# Patient Record
Sex: FEMALE | Race: ASIAN | NOT HISPANIC OR LATINO | URBAN - METROPOLITAN AREA
[De-identification: names, ages, dates, MRNs, and addresses within clinical notes are randomized per-mention and may not be internally consistent; named-entity substitution may affect disease eponyms.]

---

## 2017-02-20 ENCOUNTER — EMERGENCY (EMERGENCY)
Facility: HOSPITAL | Age: 57
LOS: 1 days | Discharge: HOME | End: 2017-02-20
Attending: EMERGENCY MEDICINE | Admitting: EMERGENCY MEDICINE
Payer: MEDICAID

## 2017-02-20 VITALS
DIASTOLIC BLOOD PRESSURE: 72 MMHG | HEART RATE: 70 BPM | SYSTOLIC BLOOD PRESSURE: 152 MMHG | RESPIRATION RATE: 16 BRPM | OXYGEN SATURATION: 100 %

## 2017-02-20 VITALS
DIASTOLIC BLOOD PRESSURE: 75 MMHG | HEART RATE: 71 BPM | SYSTOLIC BLOOD PRESSURE: 136 MMHG | RESPIRATION RATE: 18 BRPM | OXYGEN SATURATION: 100 % | TEMPERATURE: 98 F

## 2017-02-20 LAB
ALBUMIN SERPL ELPH-MCNC: 4 G/DL — SIGNIFICANT CHANGE UP (ref 3.3–5)
ALP SERPL-CCNC: 65 U/L — SIGNIFICANT CHANGE UP (ref 40–120)
ALT FLD-CCNC: 39 U/L — HIGH (ref 4–33)
APPEARANCE UR: CLEAR — SIGNIFICANT CHANGE UP
AST SERPL-CCNC: 39 U/L — HIGH (ref 4–32)
BACTERIA # UR AUTO: SIGNIFICANT CHANGE UP
BASOPHILS # BLD AUTO: 0.04 K/UL — SIGNIFICANT CHANGE UP (ref 0–0.2)
BASOPHILS NFR BLD AUTO: 0.5 % — SIGNIFICANT CHANGE UP (ref 0–2)
BILIRUB SERPL-MCNC: 0.6 MG/DL — SIGNIFICANT CHANGE UP (ref 0.2–1.2)
BILIRUB UR-MCNC: NEGATIVE — SIGNIFICANT CHANGE UP
BLOOD UR QL VISUAL: NEGATIVE — SIGNIFICANT CHANGE UP
BUN SERPL-MCNC: 22 MG/DL — SIGNIFICANT CHANGE UP (ref 7–23)
CALCIUM SERPL-MCNC: 9.6 MG/DL — SIGNIFICANT CHANGE UP (ref 8.4–10.5)
CHLORIDE SERPL-SCNC: 101 MMOL/L — SIGNIFICANT CHANGE UP (ref 98–107)
CO2 SERPL-SCNC: 25 MMOL/L — SIGNIFICANT CHANGE UP (ref 22–31)
COLOR SPEC: SIGNIFICANT CHANGE UP
CREAT SERPL-MCNC: 2.03 MG/DL — HIGH (ref 0.5–1.3)
EOSINOPHIL # BLD AUTO: 0.19 K/UL — SIGNIFICANT CHANGE UP (ref 0–0.5)
EOSINOPHIL NFR BLD AUTO: 2.3 % — SIGNIFICANT CHANGE UP (ref 0–6)
GLUCOSE SERPL-MCNC: 198 MG/DL — HIGH (ref 70–99)
GLUCOSE UR-MCNC: NEGATIVE — SIGNIFICANT CHANGE UP
HCT VFR BLD CALC: 41.8 % — SIGNIFICANT CHANGE UP (ref 34.5–45)
HGB BLD-MCNC: 14.2 G/DL — SIGNIFICANT CHANGE UP (ref 11.5–15.5)
HYALINE CASTS # UR AUTO: SIGNIFICANT CHANGE UP (ref 0–?)
IMM GRANULOCYTES NFR BLD AUTO: 0.1 % — SIGNIFICANT CHANGE UP (ref 0–1.5)
KETONES UR-MCNC: NEGATIVE — SIGNIFICANT CHANGE UP
LEUKOCYTE ESTERASE UR-ACNC: NEGATIVE — SIGNIFICANT CHANGE UP
LIDOCAIN IGE QN: 26.3 U/L — SIGNIFICANT CHANGE UP (ref 7–60)
LYMPHOCYTES # BLD AUTO: 2.6 K/UL — SIGNIFICANT CHANGE UP (ref 1–3.3)
LYMPHOCYTES # BLD AUTO: 31.6 % — SIGNIFICANT CHANGE UP (ref 13–44)
MCHC RBC-ENTMCNC: 30.2 PG — SIGNIFICANT CHANGE UP (ref 27–34)
MCHC RBC-ENTMCNC: 34 % — SIGNIFICANT CHANGE UP (ref 32–36)
MCV RBC AUTO: 88.9 FL — SIGNIFICANT CHANGE UP (ref 80–100)
MONOCYTES # BLD AUTO: 0.74 K/UL — SIGNIFICANT CHANGE UP (ref 0–0.9)
MONOCYTES NFR BLD AUTO: 9 % — SIGNIFICANT CHANGE UP (ref 2–14)
MUCOUS THREADS # UR AUTO: SIGNIFICANT CHANGE UP
NEUTROPHILS # BLD AUTO: 4.66 K/UL — SIGNIFICANT CHANGE UP (ref 1.8–7.4)
NEUTROPHILS NFR BLD AUTO: 56.5 % — SIGNIFICANT CHANGE UP (ref 43–77)
NITRITE UR-MCNC: NEGATIVE — SIGNIFICANT CHANGE UP
NON-SQ EPI CELLS # UR AUTO: <1 — SIGNIFICANT CHANGE UP
PH UR: 5.5 — SIGNIFICANT CHANGE UP (ref 4.6–8)
PLATELET # BLD AUTO: 137 K/UL — LOW (ref 150–400)
PMV BLD: 11.4 FL — SIGNIFICANT CHANGE UP (ref 7–13)
POTASSIUM SERPL-MCNC: 4.8 MMOL/L — SIGNIFICANT CHANGE UP (ref 3.5–5.3)
POTASSIUM SERPL-SCNC: 4.8 MMOL/L — SIGNIFICANT CHANGE UP (ref 3.5–5.3)
PROT SERPL-MCNC: 7.5 G/DL — SIGNIFICANT CHANGE UP (ref 6–8.3)
PROT UR-MCNC: NEGATIVE — SIGNIFICANT CHANGE UP
RBC # BLD: 4.7 M/UL — SIGNIFICANT CHANGE UP (ref 3.8–5.2)
RBC # FLD: 13.3 % — SIGNIFICANT CHANGE UP (ref 10.3–14.5)
RBC CASTS # UR COMP ASSIST: SIGNIFICANT CHANGE UP (ref 0–?)
SODIUM SERPL-SCNC: 141 MMOL/L — SIGNIFICANT CHANGE UP (ref 135–145)
SP GR SPEC: 1.01 — SIGNIFICANT CHANGE UP (ref 1–1.03)
SQUAMOUS # UR AUTO: SIGNIFICANT CHANGE UP
UROBILINOGEN FLD QL: NORMAL E.U. — SIGNIFICANT CHANGE UP (ref 0.1–0.2)
WBC # BLD: 8.24 K/UL — SIGNIFICANT CHANGE UP (ref 3.8–10.5)
WBC # FLD AUTO: 8.24 K/UL — SIGNIFICANT CHANGE UP (ref 3.8–10.5)
WBC UR QL: SIGNIFICANT CHANGE UP (ref 0–?)

## 2017-02-20 PROCEDURE — 99284 EMERGENCY DEPT VISIT MOD MDM: CPT

## 2017-02-20 PROCEDURE — 76830 TRANSVAGINAL US NON-OB: CPT | Mod: 26

## 2017-02-20 RX ORDER — SODIUM CHLORIDE 9 MG/ML
1000 INJECTION INTRAMUSCULAR; INTRAVENOUS; SUBCUTANEOUS ONCE
Qty: 0 | Refills: 0 | Status: COMPLETED | OUTPATIENT
Start: 2017-02-20 | End: 2017-02-20

## 2017-02-20 RX ORDER — METOCLOPRAMIDE HCL 10 MG
10 TABLET ORAL ONCE
Qty: 0 | Refills: 0 | Status: COMPLETED | OUTPATIENT
Start: 2017-02-20 | End: 2017-02-20

## 2017-02-20 RX ORDER — MECLIZINE HCL 12.5 MG
25 TABLET ORAL ONCE
Qty: 0 | Refills: 0 | Status: COMPLETED | OUTPATIENT
Start: 2017-02-20 | End: 2017-02-20

## 2017-02-20 RX ORDER — SODIUM CHLORIDE 9 MG/ML
3 INJECTION INTRAMUSCULAR; INTRAVENOUS; SUBCUTANEOUS ONCE
Qty: 0 | Refills: 0 | Status: COMPLETED | OUTPATIENT
Start: 2017-02-20 | End: 2017-02-20

## 2017-02-20 RX ADMIN — SODIUM CHLORIDE 1000 MILLILITER(S): 9 INJECTION INTRAMUSCULAR; INTRAVENOUS; SUBCUTANEOUS at 13:05

## 2017-02-20 RX ADMIN — Medication 10 MILLIGRAM(S): at 13:05

## 2017-02-20 RX ADMIN — SODIUM CHLORIDE 3 MILLILITER(S): 9 INJECTION INTRAMUSCULAR; INTRAVENOUS; SUBCUTANEOUS at 13:05

## 2017-02-20 NOTE — ED ADULT NURSE NOTE - OBJECTIVE STATEMENT
received pt to intake room 10b for evaluation of generalized abdominal pain, nausea, dizziness, fever and chills x 1 week, progressively worsening. pt presents awake a&ox4, denies ha or visual disturbances. skin pink, warm, dry, appropriate for race. respirations even unlabored. lungs cta. denies cp or sob. abdomen soft, tender to palpation, nondistended. bs+x4, denies active v/d. + nausea. ivl placed, bloods drawn and sent. medicated as ordered. ns 0.9% bolus infusing. awaiting ultrasound.

## 2017-02-20 NOTE — ED PROVIDER NOTE - NS ED MD SCRIBE ATTENDING SCRIBE SECTIONS
DISPOSITION/PHYSICAL EXAM/PAST MEDICAL/SURGICAL/SOCIAL HISTORY/HISTORY OF PRESENT ILLNESS/HIV/REVIEW OF SYSTEMS/VITAL SIGNS( Pullset)

## 2017-02-20 NOTE — ED PROVIDER NOTE - MEDICAL DECISION MAKING DETAILS
56 y/o F c/o lower abd pain and HA w/ dizziness. Plan: adnexal torsion vs cyst rupture, UA r/o DKA. 56 y/o F c/o lower abd pain and HA w/ dizziness. Plan: adnexal torsion vs cyst rupture, UA r/o DKA. labs all wnl except mild kidney disease. pt admits to hx of kidney disease secondary to poor dm management. labs otherwise with no dka. pt symptomatically improved. no headache at this point. instructed to f/u with pmd asap/ 58 y/o F c/o lower abd pain and HA w/ dizziness. Plan: adnexal torsion vs cyst rupture, UA r/o DKA. labs all wnl except mild kidney disease. pt admits to hx of kidney disease secondary to poor dm management. labs otherwise with no dka. pt symptomatically improved. no headache at this point. instructed to f/u with pmd asap. dr vanegas contacted and will f/u with results.

## 2017-02-20 NOTE — ED ADULT TRIAGE NOTE - CHIEF COMPLAINT QUOTE
Co left lower back pain radiating to abdomen with n/v x 1 week. Hx of diabetes, high cholesterol, anxiety.

## 2017-02-20 NOTE — ED PROVIDER NOTE - PROGRESS NOTE DETAILS
pt asymptomatic with no complications. dr. Krishnamurthy, nusrat contacted and informed of elevated creatine. will f/u outpt. pt stable. vss pt denying any abd pain, waiting for ambulette in NAD. vss.

## 2017-02-20 NOTE — ED PROVIDER NOTE - OBJECTIVE STATEMENT
58 y/o F w/ PMHx of DM poorly controlled, presents to the ED c/o lower abd pain and HA w/ dizziness. Pt notes dizziness is worse w/ movement of head, no localization of HA. Pt admits to one episode of hematuria 1 week ago, w/ no recurrent episodes. Pt endorses polyuria, adnexal pain w/ no discharge or bleeding and lower back pain that is similar in level as abd pain. Denies fever, dysuria, CP or any other complaints. 58 y/o F w/ PMHx of DM poorly controlled, presents to the ED c/o lower abd pain and HA w/ dizziness. Pt notes dizziness is worse w/ movement of head, no localization of HA.  similar dizziness to prior episodes. Pt admits to one episode of hematuria 1 week ago, w/ no recurrent episodes. Pt endorses polyuria, adnexal pain w/ no discharge or bleeding and lower back pain that is similar in level as abd pain. states pain is 4/10 lower abdomen but currently improved without analgesia.  Denies fever, dysuria, CP or any other complaints.

## 2017-02-21 NOTE — ED POST DISCHARGE NOTE - ADDITIONAL DOCUMENTATION
Ms. Lockett states still feels weak, with mild abd pain but states minimal improvement. at time of discharge pt's pain was resolved. I (gera) instructed pt to return to ed if pain worsens, starts having changes in amount of urination pt states had hx of elevated creatine but unsure of number. pt states will be going to dr. Krishnamurthy's office today. if not able to present today, will go on friday. pt again reminded to return to ed if symptoms worsen. pt grateful and understands instructions.

## 2017-05-10 ENCOUNTER — APPOINTMENT (OUTPATIENT)
Dept: GASTROENTEROLOGY | Facility: CLINIC | Age: 57
End: 2017-05-10

## 2017-05-10 VITALS
BODY MASS INDEX: 25.34 KG/M2 | TEMPERATURE: 98.5 F | SYSTOLIC BLOOD PRESSURE: 100 MMHG | HEIGHT: 63 IN | WEIGHT: 143 LBS | DIASTOLIC BLOOD PRESSURE: 70 MMHG

## 2017-05-10 DIAGNOSIS — Z12.11 ENCOUNTER FOR SCREENING FOR MALIGNANT NEOPLASM OF COLON: ICD-10-CM

## 2017-05-10 DIAGNOSIS — Z86.39 PERSONAL HISTORY OF OTHER ENDOCRINE, NUTRITIONAL AND METABOLIC DISEASE: ICD-10-CM

## 2017-05-10 DIAGNOSIS — Z82.49 FAMILY HISTORY OF ISCHEMIC HEART DISEASE AND OTHER DISEASES OF THE CIRCULATORY SYSTEM: ICD-10-CM

## 2017-05-10 DIAGNOSIS — R14.0 ABDOMINAL DISTENSION (GASEOUS): ICD-10-CM

## 2017-05-10 DIAGNOSIS — Z78.9 OTHER SPECIFIED HEALTH STATUS: ICD-10-CM

## 2017-05-10 DIAGNOSIS — K21.9 GASTRO-ESOPHAGEAL REFLUX DISEASE W/OUT ESOPHAGITIS: ICD-10-CM

## 2017-05-10 PROBLEM — Z00.00 ENCOUNTER FOR PREVENTIVE HEALTH EXAMINATION: Status: ACTIVE | Noted: 2017-05-10

## 2017-05-10 RX ORDER — SITAGLIPTIN 100 MG/1
100 TABLET, FILM COATED ORAL
Refills: 0 | Status: ACTIVE | COMMUNITY

## 2017-05-10 RX ORDER — METFORMIN HYDROCHLORIDE 1000 MG/1
1000 TABLET, COATED ORAL
Refills: 0 | Status: ACTIVE | COMMUNITY

## 2017-05-10 RX ORDER — POLYETHYLENE GLYCOL 3350, SODIUM SULFATE, SODIUM CHLORIDE, POTASSIUM CHLORIDE, ASCORBIC ACID, SODIUM ASCORBATE 7.5-2.691G
100 KIT ORAL
Qty: 1 | Refills: 0 | Status: ACTIVE | COMMUNITY
Start: 2017-05-10 | End: 1900-01-01

## 2017-05-10 RX ORDER — PANTOPRAZOLE 40 MG/1
40 TABLET, DELAYED RELEASE ORAL TWICE DAILY
Qty: 60 | Refills: 10 | Status: ACTIVE | COMMUNITY
Start: 2017-05-10 | End: 1900-01-01

## 2017-05-10 RX ORDER — PANTOPRAZOLE 40 MG/1
40 TABLET, DELAYED RELEASE ORAL
Refills: 0 | Status: ACTIVE | COMMUNITY

## 2017-05-10 RX ORDER — GLIMEPIRIDE 4 MG/1
4 TABLET ORAL
Refills: 0 | Status: ACTIVE | COMMUNITY

## 2017-05-10 RX ORDER — POLYETHYLENE GLYCOL 3350, SODIUM CHLORIDE, SODIUM BICARBONATE AND POTASSIUM CHLORIDE WITH LEMON FLAVOR 420; 11.2; 5.72; 1.48 G/4L; G/4L; G/4L; G/4L
420 POWDER, FOR SOLUTION ORAL
Qty: 1 | Refills: 0 | Status: ACTIVE | COMMUNITY
Start: 2017-05-10 | End: 1900-01-01

## 2017-05-10 RX ORDER — ASPIRIN 81 MG
81 TABLET, DELAYED RELEASE (ENTERIC COATED) ORAL
Refills: 0 | Status: ACTIVE | COMMUNITY

## 2017-07-21 ENCOUNTER — APPOINTMENT (OUTPATIENT)
Dept: GASTROENTEROLOGY | Facility: HOSPITAL | Age: 57
End: 2017-07-21

## 2018-07-28 PROBLEM — Z78.9 ALCOHOL USE: Status: INACTIVE | Noted: 2017-05-10

## 2021-07-05 NOTE — ED ADULT NURSE NOTE - THOUGHTS OF HOMICIDE/VIOLENCE TOWARDS OTHERS YN, MLM
F: tolerating PO, no IVF  E: replete K<4, Mg<2  N: Dash/TLC    VTE Prophylaxis: SCD in the setting of sever thrombocytopenia   C: Full Code  D: RMF
No

## 2022-05-24 ENCOUNTER — INPATIENT (INPATIENT)
Facility: HOSPITAL | Age: 62
LOS: 6 days | Discharge: HOME | End: 2022-05-31
Attending: INTERNAL MEDICINE | Admitting: INTERNAL MEDICINE
Payer: MEDICAID

## 2022-05-24 VITALS
SYSTOLIC BLOOD PRESSURE: 134 MMHG | RESPIRATION RATE: 16 BRPM | DIASTOLIC BLOOD PRESSURE: 59 MMHG | OXYGEN SATURATION: 98 % | TEMPERATURE: 99 F | HEART RATE: 92 BPM

## 2022-05-24 DIAGNOSIS — N12 TUBULO-INTERSTITIAL NEPHRITIS, NOT SPECIFIED AS ACUTE OR CHRONIC: ICD-10-CM

## 2022-05-24 PROBLEM — E11.9 TYPE 2 DIABETES MELLITUS WITHOUT COMPLICATIONS: Chronic | Status: ACTIVE | Noted: 2017-02-20

## 2022-05-24 LAB
ALBUMIN SERPL ELPH-MCNC: 3.9 G/DL — SIGNIFICANT CHANGE UP (ref 3.3–5)
ALP SERPL-CCNC: 80 U/L — SIGNIFICANT CHANGE UP (ref 40–120)
ALT FLD-CCNC: 17 U/L — SIGNIFICANT CHANGE UP (ref 4–33)
ANION GAP SERPL CALC-SCNC: 12 MMOL/L — SIGNIFICANT CHANGE UP (ref 7–14)
APPEARANCE UR: ABNORMAL
AST SERPL-CCNC: 20 U/L — SIGNIFICANT CHANGE UP (ref 4–32)
B PERT DNA SPEC QL NAA+PROBE: SIGNIFICANT CHANGE UP
B PERT+PARAPERT DNA PNL SPEC NAA+PROBE: SIGNIFICANT CHANGE UP
BACTERIA # UR AUTO: ABNORMAL
BASOPHILS # BLD AUTO: 0.03 K/UL — SIGNIFICANT CHANGE UP (ref 0–0.2)
BASOPHILS NFR BLD AUTO: 0.2 % — SIGNIFICANT CHANGE UP (ref 0–2)
BILIRUB SERPL-MCNC: 0.7 MG/DL — SIGNIFICANT CHANGE UP (ref 0.2–1.2)
BILIRUB UR-MCNC: NEGATIVE — SIGNIFICANT CHANGE UP
BLOOD GAS VENOUS COMPREHENSIVE RESULT: SIGNIFICANT CHANGE UP
BLOOD GAS VENOUS COMPREHENSIVE RESULT: SIGNIFICANT CHANGE UP
BORDETELLA PARAPERTUSSIS (RAPRVP): SIGNIFICANT CHANGE UP
BUN SERPL-MCNC: 14 MG/DL — SIGNIFICANT CHANGE UP (ref 7–23)
C PNEUM DNA SPEC QL NAA+PROBE: SIGNIFICANT CHANGE UP
CALCIUM SERPL-MCNC: 9.9 MG/DL — SIGNIFICANT CHANGE UP (ref 8.4–10.5)
CHLORIDE SERPL-SCNC: 93 MMOL/L — LOW (ref 98–107)
CO2 SERPL-SCNC: 27 MMOL/L — SIGNIFICANT CHANGE UP (ref 22–31)
COLOR SPEC: ABNORMAL
CREAT SERPL-MCNC: 0.78 MG/DL — SIGNIFICANT CHANGE UP (ref 0.5–1.3)
DIFF PNL FLD: ABNORMAL
EGFR: 86 ML/MIN/1.73M2 — SIGNIFICANT CHANGE UP
EOSINOPHIL # BLD AUTO: 0.04 K/UL — SIGNIFICANT CHANGE UP (ref 0–0.5)
EOSINOPHIL NFR BLD AUTO: 0.3 % — SIGNIFICANT CHANGE UP (ref 0–6)
EPI CELLS # UR: SIGNIFICANT CHANGE UP /HPF (ref 0–5)
FLUAV SUBTYP SPEC NAA+PROBE: SIGNIFICANT CHANGE UP
FLUBV RNA SPEC QL NAA+PROBE: SIGNIFICANT CHANGE UP
GLUCOSE SERPL-MCNC: 180 MG/DL — HIGH (ref 70–99)
GLUCOSE UR QL: NEGATIVE — SIGNIFICANT CHANGE UP
HADV DNA SPEC QL NAA+PROBE: SIGNIFICANT CHANGE UP
HCOV 229E RNA SPEC QL NAA+PROBE: SIGNIFICANT CHANGE UP
HCOV HKU1 RNA SPEC QL NAA+PROBE: SIGNIFICANT CHANGE UP
HCOV NL63 RNA SPEC QL NAA+PROBE: SIGNIFICANT CHANGE UP
HCOV OC43 RNA SPEC QL NAA+PROBE: SIGNIFICANT CHANGE UP
HCT VFR BLD CALC: 38.4 % — SIGNIFICANT CHANGE UP (ref 34.5–45)
HGB BLD-MCNC: 12.1 G/DL — SIGNIFICANT CHANGE UP (ref 11.5–15.5)
HMPV RNA SPEC QL NAA+PROBE: SIGNIFICANT CHANGE UP
HPIV1 RNA SPEC QL NAA+PROBE: SIGNIFICANT CHANGE UP
HPIV2 RNA SPEC QL NAA+PROBE: SIGNIFICANT CHANGE UP
HPIV3 RNA SPEC QL NAA+PROBE: SIGNIFICANT CHANGE UP
HPIV4 RNA SPEC QL NAA+PROBE: SIGNIFICANT CHANGE UP
IANC: 9.66 K/UL — HIGH (ref 1.8–7.4)
IMM GRANULOCYTES NFR BLD AUTO: 0.4 % — SIGNIFICANT CHANGE UP (ref 0–1.5)
KETONES UR-MCNC: NEGATIVE — SIGNIFICANT CHANGE UP
LEUKOCYTE ESTERASE UR-ACNC: ABNORMAL
LIDOCAIN IGE QN: 28 U/L — SIGNIFICANT CHANGE UP (ref 7–60)
LYMPHOCYTES # BLD AUTO: 17.4 % — SIGNIFICANT CHANGE UP (ref 13–44)
LYMPHOCYTES # BLD AUTO: 2.24 K/UL — SIGNIFICANT CHANGE UP (ref 1–3.3)
M PNEUMO DNA SPEC QL NAA+PROBE: SIGNIFICANT CHANGE UP
MCHC RBC-ENTMCNC: 27.3 PG — SIGNIFICANT CHANGE UP (ref 27–34)
MCHC RBC-ENTMCNC: 31.5 GM/DL — LOW (ref 32–36)
MCV RBC AUTO: 86.5 FL — SIGNIFICANT CHANGE UP (ref 80–100)
MONOCYTES # BLD AUTO: 0.85 K/UL — SIGNIFICANT CHANGE UP (ref 0–0.9)
MONOCYTES NFR BLD AUTO: 6.6 % — SIGNIFICANT CHANGE UP (ref 2–14)
NEUTROPHILS # BLD AUTO: 9.66 K/UL — HIGH (ref 1.8–7.4)
NEUTROPHILS NFR BLD AUTO: 75.1 % — SIGNIFICANT CHANGE UP (ref 43–77)
NITRITE UR-MCNC: NEGATIVE — SIGNIFICANT CHANGE UP
NRBC # BLD: 0 /100 WBCS — SIGNIFICANT CHANGE UP
NRBC # FLD: 0 K/UL — SIGNIFICANT CHANGE UP
PH UR: 6.5 — SIGNIFICANT CHANGE UP (ref 5–8)
PLATELET # BLD AUTO: 120 K/UL — LOW (ref 150–400)
POTASSIUM SERPL-MCNC: 4.2 MMOL/L — SIGNIFICANT CHANGE UP (ref 3.5–5.3)
POTASSIUM SERPL-SCNC: 4.2 MMOL/L — SIGNIFICANT CHANGE UP (ref 3.5–5.3)
PROT SERPL-MCNC: 9.9 G/DL — HIGH (ref 6–8.3)
PROT UR-MCNC: ABNORMAL
RAPID RVP RESULT: SIGNIFICANT CHANGE UP
RBC # BLD: 4.44 M/UL — SIGNIFICANT CHANGE UP (ref 3.8–5.2)
RBC # FLD: 15.1 % — HIGH (ref 10.3–14.5)
RBC CASTS # UR COMP ASSIST: 2 /HPF — SIGNIFICANT CHANGE UP (ref 0–4)
RSV RNA SPEC QL NAA+PROBE: SIGNIFICANT CHANGE UP
RV+EV RNA SPEC QL NAA+PROBE: SIGNIFICANT CHANGE UP
SARS-COV-2 RNA SPEC QL NAA+PROBE: SIGNIFICANT CHANGE UP
SODIUM SERPL-SCNC: 132 MMOL/L — LOW (ref 135–145)
SP GR SPEC: 1.03 — SIGNIFICANT CHANGE UP (ref 1–1.05)
TROPONIN T, HIGH SENSITIVITY RESULT: 12 NG/L — SIGNIFICANT CHANGE UP
UROBILINOGEN FLD QL: SIGNIFICANT CHANGE UP
WBC # BLD: 12.87 K/UL — HIGH (ref 3.8–10.5)
WBC # FLD AUTO: 12.87 K/UL — HIGH (ref 3.8–10.5)
WBC UR QL: SIGNIFICANT CHANGE UP /HPF (ref 0–5)

## 2022-05-24 PROCEDURE — 74177 CT ABD & PELVIS W/CONTRAST: CPT | Mod: 26,MA

## 2022-05-24 PROCEDURE — 99285 EMERGENCY DEPT VISIT HI MDM: CPT | Mod: 25

## 2022-05-24 PROCEDURE — 71046 X-RAY EXAM CHEST 2 VIEWS: CPT | Mod: 26

## 2022-05-24 PROCEDURE — 93010 ELECTROCARDIOGRAM REPORT: CPT

## 2022-05-24 RX ORDER — ONDANSETRON 8 MG/1
4 TABLET, FILM COATED ORAL ONCE
Refills: 0 | Status: COMPLETED | OUTPATIENT
Start: 2022-05-24 | End: 2022-05-24

## 2022-05-24 RX ORDER — SODIUM CHLORIDE 9 MG/ML
1000 INJECTION, SOLUTION INTRAVENOUS ONCE
Refills: 0 | Status: COMPLETED | OUTPATIENT
Start: 2022-05-24 | End: 2022-05-24

## 2022-05-24 RX ORDER — SODIUM CHLORIDE 9 MG/ML
3 INJECTION INTRAMUSCULAR; INTRAVENOUS; SUBCUTANEOUS EVERY 8 HOURS
Refills: 0 | Status: DISCONTINUED | OUTPATIENT
Start: 2022-05-24 | End: 2022-05-30

## 2022-05-24 RX ORDER — VANCOMYCIN HCL 1 G
1000 VIAL (EA) INTRAVENOUS ONCE
Refills: 0 | Status: COMPLETED | OUTPATIENT
Start: 2022-05-24 | End: 2022-05-24

## 2022-05-24 RX ORDER — AZTREONAM 2 G
1000 VIAL (EA) INJECTION ONCE
Refills: 0 | Status: COMPLETED | OUTPATIENT
Start: 2022-05-24 | End: 2022-05-24

## 2022-05-24 RX ORDER — SODIUM CHLORIDE 9 MG/ML
1000 INJECTION, SOLUTION INTRAVENOUS
Refills: 0 | Status: DISCONTINUED | OUTPATIENT
Start: 2022-05-24 | End: 2022-05-27

## 2022-05-24 RX ORDER — KETOROLAC TROMETHAMINE 30 MG/ML
15 SYRINGE (ML) INJECTION ONCE
Refills: 0 | Status: DISCONTINUED | OUTPATIENT
Start: 2022-05-24 | End: 2022-05-24

## 2022-05-24 RX ORDER — ACETAMINOPHEN 500 MG
1000 TABLET ORAL ONCE
Refills: 0 | Status: COMPLETED | OUTPATIENT
Start: 2022-05-24 | End: 2022-05-24

## 2022-05-24 RX ORDER — CEFTRIAXONE 500 MG/1
1000 INJECTION, POWDER, FOR SOLUTION INTRAMUSCULAR; INTRAVENOUS ONCE
Refills: 0 | Status: DISCONTINUED | OUTPATIENT
Start: 2022-05-24 | End: 2022-05-24

## 2022-05-24 RX ADMIN — ONDANSETRON 4 MILLIGRAM(S): 8 TABLET, FILM COATED ORAL at 16:07

## 2022-05-24 RX ADMIN — SODIUM CHLORIDE 1000 MILLILITER(S): 9 INJECTION, SOLUTION INTRAVENOUS at 16:06

## 2022-05-24 RX ADMIN — ONDANSETRON 4 MILLIGRAM(S): 8 TABLET, FILM COATED ORAL at 23:12

## 2022-05-24 RX ADMIN — Medication 50 MILLIGRAM(S): at 23:47

## 2022-05-24 RX ADMIN — Medication 15 MILLIGRAM(S): at 23:12

## 2022-05-24 RX ADMIN — SODIUM CHLORIDE 1000 MILLILITER(S): 9 INJECTION, SOLUTION INTRAVENOUS at 22:04

## 2022-05-24 RX ADMIN — Medication 400 MILLIGRAM(S): at 16:24

## 2022-05-24 RX ADMIN — Medication 250 MILLIGRAM(S): at 22:45

## 2022-05-24 NOTE — ED PROVIDER NOTE - PHYSICAL EXAMINATION
VS:  unremarkable    GEN - Malaise and fatigue  HEAD - NC/AT     ENT - PEERL, EOMI, Dry mucous membranes      NECK: Neck supple, non-tender without lymphadenopathy, no masses, no JVD  PULM - CTA b/l,  symmetric breath sounds  COR -  normal heart sounds    ABD - , ND, NT, soft, No abdominal distention   BACK - no CVA tenderness, nontender spine     EXTREMS - no edema, no deformity, warm and well perfused. Left lower quad tenderness   SKIN - no rash    or bruising      NEUROLOGIC - alert, face symmetric, speech fluent, sensation nl, motor no focal deficit.

## 2022-05-24 NOTE — ED ADULT NURSE NOTE - NSIMPLEMENTINTERV_GEN_ALL_ED
Implemented All Fall Risk Interventions:  [place] to call system. Call bell, personal items and telephone within reach. Instruct patient to call for assistance. Room bathroom lighting operational. Non-slip footwear when patient is off stretcher. Physically safe environment: no spills, clutter or unnecessary equipment. Stretcher in lowest position, wheels locked, appropriate side rails in place. Provide visual cue, wrist band, yellow gown, etc. Monitor gait and stability. Monitor for mental status changes and reorient to person, place, and time. Review medications for side effects contributing to fall risk. Reinforce activity limits and safety measures with patient and family.

## 2022-05-24 NOTE — ED PROVIDER NOTE - OBJECTIVE STATEMENT
63 y/o F w/ PMHx of diabetes (takes Metformin and other medications, she is unaware of the names) and PSHx of appendectomy presents to the ED c/o right flank pain radiating to the abdomen for four days PTA. The pain is associated w/ vomiting and she is unable to tolerate oral intake. Pt states the pain continues to worsen each day. She reports lightheadedness, dizziness, and vomiting for months. Denies diarrhea. Pt reports associated symptoms of fever. She has seen her MD who gave her more than 1 course of antibiotics. Pt states they have dysuria and that the recent course of antibiotic gave her an allergic reaction, which was vomiting. Her MD did not obtain imaging. Pt denies HTN, CAD and thyroid issues. She denies smoking. Allergic to codeine and penicillin which makes her vomit. Pt is unsure of the allergy or what it is called. Her PMD is JUAN C Wilburn and their number is [phone].

## 2022-05-24 NOTE — ED ADULT NURSE NOTE - OBJECTIVE STATEMENT
62 year old female received to intake rm 3 AAOx4 ambulatory with cane. Pt c/o recurrent UTI x4 months. Pt c/o lightheadedness, abd pain, n/v, fever, unsteady gait d/t "shakiness", burning with urination. Pt given abx from [facility] in March but stopped taking them d/t nausea/vomiting. Respirations even and unlabored. PIV #20 left wrist, labs drawn and sent. VS as noted. Medicated per MD orders. Bed in lowest position, call bell within reach

## 2022-05-24 NOTE — ED PROVIDER NOTE - CLINICAL SUMMARY MEDICAL DECISION MAKING FREE TEXT BOX
63 y/o F presents to the ED c/o nausea, vomiting, and abdominal pain. In a setting of treatment for UTI. Possibilites are broad but include UTI, pyelonephritis, cholecystis, bowel obstruction and less likely but can include DKA or ACS. Will obtain labs, CT scans, check urine and provide symptomatic treatment.

## 2022-05-24 NOTE — ED ADULT TRIAGE NOTE - CHIEF COMPLAINT QUOTE
Pt c/o chronic UTI's x4 months.  Since yesterday c/o abd pain, lightheadedness, dizziness, and vomiting.  Given antibiotics but they made her nauseous so she stopped them.

## 2022-05-24 NOTE — ED PROVIDER NOTE - PROGRESS NOTE DETAILS
DD ED ATTG:  worsening.  Pt having chills, nausea.  CT and UA c/f pyelo.  D/w PMD at above number - he tx her with macrobid x 2.  Per PMD pt lives alone and he was concerned due to her being disheveled and isolated.  Per PMD pt did not eat for two weeks.  PMD initiated home care/home attendant, but pt did not pursue this.  Pt does not have relatives nearby.  Given chills, pyelo, poor PO intake, poor follow up potential will admit for IV abx and SW/CM eval.  Per PMD - meds metformin ramipril glimepiride famotidine.  last hgb a1c 9.1.    last urine culture ecoli - Resistant to levaquin, bactrim cipro. DD ED ATTG:  Pt noted to have chills and high fever to nearly 104.   d/w admitting hospitalist Dr Meyer - Given possible abx resistance and chills he recc azactam and vanco given pcn all.  Cultures and lactate sent.

## 2022-05-25 DIAGNOSIS — U07.1 COVID-19: ICD-10-CM

## 2022-05-25 DIAGNOSIS — Z90.49 ACQUIRED ABSENCE OF OTHER SPECIFIED PARTS OF DIGESTIVE TRACT: Chronic | ICD-10-CM

## 2022-05-25 DIAGNOSIS — E87.1 HYPO-OSMOLALITY AND HYPONATREMIA: ICD-10-CM

## 2022-05-25 DIAGNOSIS — N12 TUBULO-INTERSTITIAL NEPHRITIS, NOT SPECIFIED AS ACUTE OR CHRONIC: ICD-10-CM

## 2022-05-25 DIAGNOSIS — D69.6 THROMBOCYTOPENIA, UNSPECIFIED: ICD-10-CM

## 2022-05-25 DIAGNOSIS — R42 DIZZINESS AND GIDDINESS: ICD-10-CM

## 2022-05-25 DIAGNOSIS — E11.9 TYPE 2 DIABETES MELLITUS WITHOUT COMPLICATIONS: ICD-10-CM

## 2022-05-25 DIAGNOSIS — A41.9 SEPSIS, UNSPECIFIED ORGANISM: ICD-10-CM

## 2022-05-25 DIAGNOSIS — R62.7 ADULT FAILURE TO THRIVE: ICD-10-CM

## 2022-05-25 DIAGNOSIS — Z79.899 OTHER LONG TERM (CURRENT) DRUG THERAPY: ICD-10-CM

## 2022-05-25 DIAGNOSIS — Z29.9 ENCOUNTER FOR PROPHYLACTIC MEASURES, UNSPECIFIED: ICD-10-CM

## 2022-05-25 DIAGNOSIS — R63.4 ABNORMAL WEIGHT LOSS: ICD-10-CM

## 2022-05-25 DIAGNOSIS — Z65.9 PROBLEM RELATED TO UNSPECIFIED PSYCHOSOCIAL CIRCUMSTANCES: ICD-10-CM

## 2022-05-25 DIAGNOSIS — R79.89 OTHER SPECIFIED ABNORMAL FINDINGS OF BLOOD CHEMISTRY: ICD-10-CM

## 2022-05-25 LAB
A1C WITH ESTIMATED AVERAGE GLUCOSE RESULT: 9.3 % — HIGH (ref 4–5.6)
ANION GAP SERPL CALC-SCNC: 11 MMOL/L — SIGNIFICANT CHANGE UP (ref 7–14)
BASE EXCESS BLDV CALC-SCNC: 4.3 MMOL/L — HIGH (ref -2–3)
BASOPHILS # BLD AUTO: 0.03 K/UL — SIGNIFICANT CHANGE UP (ref 0–0.2)
BASOPHILS NFR BLD AUTO: 0.3 % — SIGNIFICANT CHANGE UP (ref 0–2)
BLOOD GAS VENOUS COMPREHENSIVE RESULT: SIGNIFICANT CHANGE UP
BUN SERPL-MCNC: 12 MG/DL — SIGNIFICANT CHANGE UP (ref 7–23)
CALCIUM SERPL-MCNC: 8.8 MG/DL — SIGNIFICANT CHANGE UP (ref 8.4–10.5)
CHLORIDE BLDV-SCNC: 96 MMOL/L — SIGNIFICANT CHANGE UP (ref 96–108)
CHLORIDE SERPL-SCNC: 96 MMOL/L — LOW (ref 98–107)
CHOLEST SERPL-MCNC: 106 MG/DL — SIGNIFICANT CHANGE UP
CLOSURE TME COLL+EPINEP BLD: 63 K/UL — LOW (ref 150–400)
CO2 BLDV-SCNC: 31.8 MMOL/L — HIGH (ref 22–26)
CO2 SERPL-SCNC: 24 MMOL/L — SIGNIFICANT CHANGE UP (ref 22–31)
CREAT SERPL-MCNC: 0.76 MG/DL — SIGNIFICANT CHANGE UP (ref 0.5–1.3)
E COLI DNA BLD POS QL NAA+NON-PROBE: SIGNIFICANT CHANGE UP
EGFR: 89 ML/MIN/1.73M2 — SIGNIFICANT CHANGE UP
EOSINOPHIL # BLD AUTO: 0.02 K/UL — SIGNIFICANT CHANGE UP (ref 0–0.5)
EOSINOPHIL NFR BLD AUTO: 0.2 % — SIGNIFICANT CHANGE UP (ref 0–6)
ESTIMATED AVERAGE GLUCOSE: 220 — SIGNIFICANT CHANGE UP
GAS PNL BLDV: 129 MMOL/L — LOW (ref 136–145)
GLUCOSE BLDC GLUCOMTR-MCNC: 246 MG/DL — HIGH (ref 70–99)
GLUCOSE BLDC GLUCOMTR-MCNC: 287 MG/DL — HIGH (ref 70–99)
GLUCOSE BLDV-MCNC: 228 MG/DL — HIGH (ref 70–99)
GLUCOSE SERPL-MCNC: 219 MG/DL — HIGH (ref 70–99)
GRAM STN FLD: SIGNIFICANT CHANGE UP
GRAM STN FLD: SIGNIFICANT CHANGE UP
HCO3 BLDV-SCNC: 30 MMOL/L — HIGH (ref 22–29)
HCT VFR BLD CALC: 33.1 % — LOW (ref 34.5–45)
HCT VFR BLDA CALC: 37 % — SIGNIFICANT CHANGE UP (ref 34.5–46.5)
HCV AB S/CO SERPL IA: 0.12 S/CO — SIGNIFICANT CHANGE UP (ref 0–0.99)
HCV AB SERPL-IMP: SIGNIFICANT CHANGE UP
HDLC SERPL-MCNC: 29 MG/DL — LOW
HGB BLD CALC-MCNC: 12.3 G/DL — SIGNIFICANT CHANGE UP (ref 11.5–15.5)
HGB BLD-MCNC: 10.6 G/DL — LOW (ref 11.5–15.5)
IANC: 8.39 K/UL — HIGH (ref 1.8–7.4)
IMM GRANULOCYTES NFR BLD AUTO: 0.4 % — SIGNIFICANT CHANGE UP (ref 0–1.5)
LACTATE BLDV-MCNC: 2.2 MMOL/L — HIGH (ref 0.5–2)
LIPID PNL WITH DIRECT LDL SERPL: 56 MG/DL — SIGNIFICANT CHANGE UP
LYMPHOCYTES # BLD AUTO: 1.59 K/UL — SIGNIFICANT CHANGE UP (ref 1–3.3)
LYMPHOCYTES # BLD AUTO: 14.5 % — SIGNIFICANT CHANGE UP (ref 13–44)
MAGNESIUM SERPL-MCNC: 1.4 MG/DL — LOW (ref 1.6–2.6)
MCHC RBC-ENTMCNC: 27.2 PG — SIGNIFICANT CHANGE UP (ref 27–34)
MCHC RBC-ENTMCNC: 32 GM/DL — SIGNIFICANT CHANGE UP (ref 32–36)
MCV RBC AUTO: 84.9 FL — SIGNIFICANT CHANGE UP (ref 80–100)
METHOD TYPE: SIGNIFICANT CHANGE UP
MONOCYTES # BLD AUTO: 0.86 K/UL — SIGNIFICANT CHANGE UP (ref 0–0.9)
MONOCYTES NFR BLD AUTO: 7.9 % — SIGNIFICANT CHANGE UP (ref 2–14)
NEUTROPHILS # BLD AUTO: 8.39 K/UL — HIGH (ref 1.8–7.4)
NEUTROPHILS NFR BLD AUTO: 76.7 % — SIGNIFICANT CHANGE UP (ref 43–77)
NON HDL CHOLESTEROL: 77 MG/DL — SIGNIFICANT CHANGE UP
NRBC # BLD: 0 /100 WBCS — SIGNIFICANT CHANGE UP
NRBC # FLD: 0 K/UL — SIGNIFICANT CHANGE UP
PCO2 BLDV: 50 MMHG — HIGH (ref 39–42)
PH BLDV: 7.39 — SIGNIFICANT CHANGE UP (ref 7.32–7.43)
PHOSPHATE SERPL-MCNC: 2.3 MG/DL — LOW (ref 2.5–4.5)
PLATELET # BLD AUTO: 86 K/UL — LOW (ref 150–400)
PO2 BLDV: 22 MMHG — SIGNIFICANT CHANGE UP
POTASSIUM BLDV-SCNC: 4.2 MMOL/L — SIGNIFICANT CHANGE UP (ref 3.5–5.1)
POTASSIUM SERPL-MCNC: 4 MMOL/L — SIGNIFICANT CHANGE UP (ref 3.5–5.3)
POTASSIUM SERPL-SCNC: 4 MMOL/L — SIGNIFICANT CHANGE UP (ref 3.5–5.3)
RBC # BLD: 3.9 M/UL — SIGNIFICANT CHANGE UP (ref 3.8–5.2)
RBC # FLD: 15.1 % — HIGH (ref 10.3–14.5)
SAO2 % BLDV: 31.3 % — SIGNIFICANT CHANGE UP
SODIUM SERPL-SCNC: 131 MMOL/L — LOW (ref 135–145)
SPECIMEN SOURCE: SIGNIFICANT CHANGE UP
SPECIMEN SOURCE: SIGNIFICANT CHANGE UP
TRIGL SERPL-MCNC: 106 MG/DL — SIGNIFICANT CHANGE UP
TSH SERPL-MCNC: 1.14 UIU/ML — SIGNIFICANT CHANGE UP (ref 0.27–4.2)
VANCOMYCIN TROUGH SERPL-MCNC: 11.3 UG/ML — SIGNIFICANT CHANGE UP (ref 10–20)
WBC # BLD: 10.93 K/UL — HIGH (ref 3.8–10.5)
WBC # FLD AUTO: 10.93 K/UL — HIGH (ref 3.8–10.5)

## 2022-05-25 PROCEDURE — 12345: CPT | Mod: NC

## 2022-05-25 PROCEDURE — 99222 1ST HOSP IP/OBS MODERATE 55: CPT

## 2022-05-25 PROCEDURE — 99223 1ST HOSP IP/OBS HIGH 75: CPT

## 2022-05-25 RX ORDER — RAMIPRIL 5 MG
0 CAPSULE ORAL
Qty: 0 | Refills: 0 | DISCHARGE

## 2022-05-25 RX ORDER — MEROPENEM 1 G/30ML
1000 INJECTION INTRAVENOUS EVERY 8 HOURS
Refills: 0 | Status: DISCONTINUED | OUTPATIENT
Start: 2022-05-25 | End: 2022-05-27

## 2022-05-25 RX ORDER — GLUCAGON INJECTION, SOLUTION 0.5 MG/.1ML
1 INJECTION, SOLUTION SUBCUTANEOUS ONCE
Refills: 0 | Status: DISCONTINUED | OUTPATIENT
Start: 2022-05-25 | End: 2022-05-31

## 2022-05-25 RX ORDER — GLIMEPIRIDE 1 MG
0 TABLET ORAL
Qty: 0 | Refills: 0 | DISCHARGE

## 2022-05-25 RX ORDER — ONDANSETRON 8 MG/1
4 TABLET, FILM COATED ORAL EVERY 8 HOURS
Refills: 0 | Status: DISCONTINUED | OUTPATIENT
Start: 2022-05-25 | End: 2022-05-31

## 2022-05-25 RX ORDER — ONDANSETRON 8 MG/1
4 TABLET, FILM COATED ORAL EVERY 8 HOURS
Refills: 0 | Status: DISCONTINUED | OUTPATIENT
Start: 2022-05-25 | End: 2022-05-27

## 2022-05-25 RX ORDER — DEXTROSE 50 % IN WATER 50 %
25 SYRINGE (ML) INTRAVENOUS ONCE
Refills: 0 | Status: DISCONTINUED | OUTPATIENT
Start: 2022-05-25 | End: 2022-05-31

## 2022-05-25 RX ORDER — MAGNESIUM SULFATE 500 MG/ML
2 VIAL (ML) INJECTION ONCE
Refills: 0 | Status: COMPLETED | OUTPATIENT
Start: 2022-05-25 | End: 2022-05-25

## 2022-05-25 RX ORDER — INSULIN LISPRO 100/ML
VIAL (ML) SUBCUTANEOUS AT BEDTIME
Refills: 0 | Status: DISCONTINUED | OUTPATIENT
Start: 2022-05-25 | End: 2022-05-31

## 2022-05-25 RX ORDER — ACETAMINOPHEN 500 MG
650 TABLET ORAL EVERY 6 HOURS
Refills: 0 | Status: DISCONTINUED | OUTPATIENT
Start: 2022-05-25 | End: 2022-05-31

## 2022-05-25 RX ORDER — METFORMIN HYDROCHLORIDE 850 MG/1
1 TABLET ORAL
Qty: 0 | Refills: 0 | DISCHARGE

## 2022-05-25 RX ORDER — ACETAMINOPHEN 500 MG
1000 TABLET ORAL ONCE
Refills: 0 | Status: COMPLETED | OUTPATIENT
Start: 2022-05-25 | End: 2022-05-25

## 2022-05-25 RX ORDER — RAMIPRIL 5 MG
1 CAPSULE ORAL
Qty: 0 | Refills: 0 | DISCHARGE

## 2022-05-25 RX ORDER — FAMOTIDINE 10 MG/ML
0 INJECTION INTRAVENOUS
Qty: 0 | Refills: 0 | DISCHARGE

## 2022-05-25 RX ORDER — ENOXAPARIN SODIUM 100 MG/ML
40 INJECTION SUBCUTANEOUS EVERY 24 HOURS
Refills: 0 | Status: DISCONTINUED | OUTPATIENT
Start: 2022-05-25 | End: 2022-05-31

## 2022-05-25 RX ORDER — FAMOTIDINE 10 MG/ML
1 INJECTION INTRAVENOUS
Qty: 0 | Refills: 0 | DISCHARGE

## 2022-05-25 RX ORDER — VANCOMYCIN HCL 1 G
1000 VIAL (EA) INTRAVENOUS EVERY 12 HOURS
Refills: 0 | Status: DISCONTINUED | OUTPATIENT
Start: 2022-05-25 | End: 2022-05-26

## 2022-05-25 RX ORDER — DEXTROSE 50 % IN WATER 50 %
15 SYRINGE (ML) INTRAVENOUS ONCE
Refills: 0 | Status: DISCONTINUED | OUTPATIENT
Start: 2022-05-25 | End: 2022-05-31

## 2022-05-25 RX ORDER — SODIUM CHLORIDE 9 MG/ML
1000 INJECTION, SOLUTION INTRAVENOUS
Refills: 0 | Status: DISCONTINUED | OUTPATIENT
Start: 2022-05-25 | End: 2022-05-31

## 2022-05-25 RX ORDER — INSULIN LISPRO 100/ML
VIAL (ML) SUBCUTANEOUS
Refills: 0 | Status: DISCONTINUED | OUTPATIENT
Start: 2022-05-25 | End: 2022-05-31

## 2022-05-25 RX ORDER — SODIUM CHLORIDE 9 MG/ML
1000 INJECTION, SOLUTION INTRAVENOUS
Refills: 0 | Status: DISCONTINUED | OUTPATIENT
Start: 2022-05-25 | End: 2022-05-27

## 2022-05-25 RX ORDER — INSULIN GLARGINE 100 [IU]/ML
10 INJECTION, SOLUTION SUBCUTANEOUS AT BEDTIME
Refills: 0 | Status: DISCONTINUED | OUTPATIENT
Start: 2022-05-25 | End: 2022-05-26

## 2022-05-25 RX ORDER — ACETAMINOPHEN 500 MG
650 TABLET ORAL EVERY 6 HOURS
Refills: 0 | Status: DISCONTINUED | OUTPATIENT
Start: 2022-05-25 | End: 2022-05-25

## 2022-05-25 RX ORDER — ACETAMINOPHEN 500 MG
500 TABLET ORAL ONCE
Refills: 0 | Status: COMPLETED | OUTPATIENT
Start: 2022-05-25 | End: 2022-05-25

## 2022-05-25 RX ORDER — DEXTROSE 50 % IN WATER 50 %
12.5 SYRINGE (ML) INTRAVENOUS ONCE
Refills: 0 | Status: DISCONTINUED | OUTPATIENT
Start: 2022-05-25 | End: 2022-05-31

## 2022-05-25 RX ADMIN — SODIUM CHLORIDE 100 MILLILITER(S): 9 INJECTION, SOLUTION INTRAVENOUS at 14:02

## 2022-05-25 RX ADMIN — MEROPENEM 100 MILLIGRAM(S): 1 INJECTION INTRAVENOUS at 14:01

## 2022-05-25 RX ADMIN — SODIUM CHLORIDE 3 MILLILITER(S): 9 INJECTION INTRAMUSCULAR; INTRAVENOUS; SUBCUTANEOUS at 22:03

## 2022-05-25 RX ADMIN — SODIUM CHLORIDE 3 MILLILITER(S): 9 INJECTION INTRAMUSCULAR; INTRAVENOUS; SUBCUTANEOUS at 06:22

## 2022-05-25 RX ADMIN — Medication 500 MILLIGRAM(S): at 06:42

## 2022-05-25 RX ADMIN — SODIUM CHLORIDE 3 MILLILITER(S): 9 INJECTION INTRAMUSCULAR; INTRAVENOUS; SUBCUTANEOUS at 14:21

## 2022-05-25 RX ADMIN — Medication 250 MILLIGRAM(S): at 10:54

## 2022-05-25 RX ADMIN — MEROPENEM 100 MILLIGRAM(S): 1 INJECTION INTRAVENOUS at 06:22

## 2022-05-25 RX ADMIN — Medication 2: at 13:22

## 2022-05-25 RX ADMIN — Medication 25 GRAM(S): at 10:54

## 2022-05-25 RX ADMIN — Medication 250 MILLIGRAM(S): at 23:28

## 2022-05-25 RX ADMIN — INSULIN GLARGINE 10 UNIT(S): 100 INJECTION, SOLUTION SUBCUTANEOUS at 22:51

## 2022-05-25 RX ADMIN — ENOXAPARIN SODIUM 40 MILLIGRAM(S): 100 INJECTION SUBCUTANEOUS at 06:22

## 2022-05-25 RX ADMIN — MEROPENEM 100 MILLIGRAM(S): 1 INJECTION INTRAVENOUS at 22:51

## 2022-05-25 RX ADMIN — Medication 2: at 09:13

## 2022-05-25 RX ADMIN — Medication 1000 MILLIGRAM(S): at 18:56

## 2022-05-25 RX ADMIN — Medication 400 MILLIGRAM(S): at 18:18

## 2022-05-25 RX ADMIN — SODIUM CHLORIDE 100 MILLILITER(S): 9 INJECTION, SOLUTION INTRAVENOUS at 23:29

## 2022-05-25 RX ADMIN — Medication 63.75 MILLIMOLE(S): at 14:01

## 2022-05-25 RX ADMIN — Medication 200 MILLIGRAM(S): at 05:53

## 2022-05-25 RX ADMIN — Medication 2: at 18:19

## 2022-05-25 NOTE — H&P ADULT - PROBLEM SELECTOR PLAN 1
Admit to medicine, continue monitoring.  CTAP w/IV contrast showing Heterogeneous enhancement of the bilateral kidneys, suspicious for   pyelonephritis. Correlate with urinalysis.  UA showing large leukocyte esterase.  Patient s/p aztreonam and vancomycin in ED.  Patient started on Meropenem.  Follow up Urine culture.

## 2022-05-25 NOTE — PROGRESS NOTE ADULT - ASSESSMENT
63 y/o M with PMH of Type 2 Diabetes on Metformin, Hypertension on Ramipril presents to the ED complaining of bilateral flank and lower abdominal pain admitted for pyelonephritis.

## 2022-05-25 NOTE — CONSULT NOTE ADULT - SUBJECTIVE AND OBJECTIVE BOX
HPI:  61 y/o M with PMH of Type 2 Diabetes on Metformin, Hypertension on Ramipril presents to the ED complaining of bilateral flank and lower abdominal pain. Patient states pain has been occurring for 4 days. Patient reports pain has worsened. Patient endorses flank pain with radiation to abdomen. Patient also endorses dysuria, nausea and vomiting as well as fever and chills. Patient reports poor PO intake. Of note patient reports having that she has had UTI since March. Patient reports that she hospitalized at [facility] for 2 weeks and received antibiotics. Patient reports that when she was discharged she was feeling better. Patient reports that symptoms returned later and endorses poor PO intake since March. Patient endorses 25 pound weight loss since March. Patient lives on her own and reports ambulating with cane due to dizziness. Per ED note' patient's last urine culture grew e-coli resistant to Levaquin ,bactrim and Cipro. Patient was prescribed Macrobid however patient reports allergic reaction of nausea and vomiting.    In ED CTAP showed Heterogeneous enhancement of the bilateral kidneys, suspicious for pyelonephritis. UA showed large leukocytosis. Patient received vancomycin and aztreonam in ED. Urine and blood cultures were sent. (25 May 2022 00:16)    Endo HPI:  Patient is feeling ill and weak, limited desire to discuss diabetes history at this time. At home patient's regimen includes: Actos 30mg qd, glimepiride 4mg bid, metformin 1000mg bid, was also prescribed admelog 10units TID but patient says she does not take insulin at home. Checks FS at home, ranges 109-120 per patient although she says they have been higher since infection started. She said yes she sometimes gets low blood sugar at home but then was not able to answer additional questions about it. Does not have endo-sees PCP for her diabetes.     Said she is not really eating while here in hospital; has cranberry and apple juice at bedside and said she is drinking fluids.    PAST MEDICAL & SURGICAL HISTORY:  DM (diabetes mellitus)      Hypertension      History of appendectomy      S/P appendectomy          FAMILY HISTORY:  FH: type 2 diabetes    FH: hypertension        Social History: unable to obtain      MEDICATIONS  (STANDING):  dextrose 5%. 1000 milliLiter(s) (100 mL/Hr) IV Continuous <Continuous>  dextrose 5%. 1000 milliLiter(s) (50 mL/Hr) IV Continuous <Continuous>  dextrose 50% Injectable 25 Gram(s) IV Push once  dextrose 50% Injectable 12.5 Gram(s) IV Push once  dextrose 50% Injectable 25 Gram(s) IV Push once  enoxaparin Injectable 40 milliGRAM(s) SubCutaneous every 24 hours  glucagon  Injectable 1 milliGRAM(s) IntraMuscular once  insulin lispro (ADMELOG) corrective regimen sliding scale   SubCutaneous three times a day before meals  insulin lispro (ADMELOG) corrective regimen sliding scale   SubCutaneous at bedtime  lactated ringers. 1000 milliLiter(s) (100 mL/Hr) IV Continuous <Continuous>  lactated ringers. 1000 milliLiter(s) (100 mL/Hr) IV Continuous <Continuous>  meropenem  IVPB 1000 milliGRAM(s) IV Intermittent every 8 hours  sodium chloride 0.9% lock flush 3 milliLiter(s) IV Push every 8 hours  vancomycin  IVPB 1000 milliGRAM(s) IV Intermittent every 12 hours    MEDICATIONS  (PRN):  acetaminophen     Tablet .. 650 milliGRAM(s) Oral every 6 hours PRN Temp greater or equal to 38C (100.4F), Mild Pain (1 - 3), Moderate Pain (4 - 6)  dextrose Oral Gel 15 Gram(s) Oral once PRN Blood Glucose LESS THAN 70 milliGRAM(s)/deciliter  ondansetron Injectable 4 milliGRAM(s) IV Push every 8 hours PRN Nausea and/or Vomiting  ondansetron Injectable 4 milliGRAM(s) IV Push every 8 hours PRN Nausea and/or Vomiting      Allergies    codeine (Nausea)  penicillin (Nausea)        Review of Systems: as per HPI; beyond that limited due to patient feeling ill      PHYSICAL EXAM:  VITALS: T(C): 38 (05-25-22 @ 12:30)  T(F): 100.4 (05-25-22 @ 12:30), Max: 103.9 (05-24-22 @ 22:42)  HR: 103 (05-25-22 @ 12:30) (79 - 121)  BP: 160/69 (05-25-22 @ 12:30) (105/56 - 183/98)  RR:  (16 - 20)  SpO2:  (93% - 100%)  Wt(kg): 60.2kg  GENERAL: ill appearing, lying in bed with hat covering eyes "to avoid migrane"   EYES: No proptosis  HEENT:  Atraumatic, Normocephalic  CARDIOVASCULAR: Mild tachycardia, no murmur, no peripheral edema   GI: Normal bowel sounds, soft  SKIN: Dry, intact  MUSCULOSKELETAL: moving all extremities      CAPILLARY BLOOD GLUCOSE      POCT Blood Glucose.: 244 mg/dL (25 May 2022 12:37)  POCT Blood Glucose.: 246 mg/dL (25 May 2022 08:46)  POCT Blood Glucose.: 287 mg/dL (25 May 2022 00:12)                            10.6   10.93 )-----------( 86       ( 25 May 2022 06:15 )             33.1       05-25    131<L>  |  96<L>  |  12  ----------------------------<  219<H>  4.0   |  24  |  0.76    eGFR: 89    Ca    8.8      05-25  Mg     1.40     05-25  Phos  2.3     05-25    TPro  9.9<H>  /  Alb  3.9  /  TBili  0.7    /  AST  20  /  ALT  17  /  AlkPhos  80  05-24      Thyroid Function Tests:  05-25 @ 06:15 TSH 1.14      A1C with Estimated Average Glucose Result: 9.3 % (05-25-22 @ 06:15)      05-25 Chol 106 Direct LDL -- LDL calculated 56 HDL 29<L> Trig 106

## 2022-05-25 NOTE — H&P ADULT - NSHPLABSRESULTS_GEN_ALL_CORE
Vital Signs Last 24 Hrs  T(C): 38.7 (24 May 2022 23:30), Max: 39.9 (24 May 2022 22:42)  T(F): 101.6 (24 May 2022 23:30), Max: 103.9 (24 May 2022 22:42)  HR: 107 (24 May 2022 23:30) (79 - 120)  BP: 136/67 (24 May 2022 23:30) (105/56 - 183/98)  BP(mean): --  RR: 20 (24 May 2022 21:21) (16 - 20)  SpO2: 100% (24 May 2022 21:21) (98% - 100%)                          12.1   12.87 )-----------( 120      ( 24 May 2022 16:05 )             38.4   05    132<L>  |  93<L>  |  14  ----------------------------<  180<H>  4.2   |  27  |  0.78    Ca    9.9      24 May 2022 16:05    TPro  9.9<H>  /  Alb  3.9  /  TBili  0.7  /  DBili  x   /  AST  20  /  ALT  17  /  AlkPhos  80  -    21:25 - VBG - pH: 7.36  | pCO2: 52    | pO2: 21    | Lactate: 4.1    16:05 - VBG - pH: 7.41  | pCO2: 50    | pO2: 22    | Lactate: 2.2      Urinalysis Basic - ( 24 May 2022 19:33 )    Color: Light Orange / Appearance: Turbid / S.031 / pH: x  Gluc: x / Ketone: Negative  / Bili: Negative / Urobili: <2 mg/dL   Blood: x / Protein: 30 mg/dL / Nitrite: Negative   Leuk Esterase: Large / RBC: 2 /HPF / WBC tntc /HPF   Sq Epi: x / Non Sq Epi: few /HPF / Bacteria: Moderate    RVP and COVID 19 PCR: Negative.    CT Abdomen and pelvis w/IV contrast:  FINDINGS:  LOWER CHEST: Within normal limits.    LIVER: Within normal limits.  BILE DUCTS: Normal caliber.  GALLBLADDER: Within normal limits.  SPLEEN: Within normal limits.  PANCREAS: Within normal limits.  ADRENALS: Within normal limits.  KIDNEYS/URETERS: Heterogeneous enhancement of the bilateral kidneys. No   renal stones or hydronephrosis.    BLADDER: Within normal limits.  REPRODUCTIVE ORGANS: Uterus and adnexa within normal limits.    BOWEL: Colonic diverticulosis. No bowel obstruction. Appendectomy.  PERITONEUM: No ascites.  VESSELS: Atherosclerotic changes.  RETROPERITONEUM/LYMPH NODES: No lymphadenopathy.  ABDOMINAL WALL: Within normal limits.  BONES: Degenerative changes.    IMPRESSION:  Heterogeneous enhancement of the bilateral kidneys, suspicious for   pyelonephritis. Correlate with urinalysis.    CXR:  FINDINGS:  Heart/Vascular: Heart size is normal.  Pulmonary: Clear lungs. No pneumothorax or pleural effusion.  Bones: No acute bony pathology.    Impression:  Clear lungs.    EKG: Sinus tachycardia at 111 bpm. QT/QTc 312/424.

## 2022-05-25 NOTE — PROGRESS NOTE ADULT - PROBLEM SELECTOR PLAN 9
Patient lives by herself and does not have assistance at home.  Social work consult for possible HHA.

## 2022-05-25 NOTE — H&P ADULT - PROBLEM SELECTOR PLAN 4
Patient reporting dizziness since march.  Possibly in setting of poor PO intake.  Orthostatics ordered.  Patient on LR at 100 ml/hr for 10 hours.  If no improvement consider Echo.

## 2022-05-25 NOTE — H&P ADULT - PROBLEM SELECTOR PLAN 2
Patient meets sepsis criteria due to fever and leukocytosis.  Follow up blood and urine cultures.  Vitals q4h.  Patient on LR at 100 ml/hr for 10 hours.  Monitor closely.

## 2022-05-25 NOTE — PROGRESS NOTE ADULT - SUBJECTIVE AND OBJECTIVE BOX
Cecille Manzano MD  PGY 2 Department of Internal Medicine  Pager: [phone] ([facility]) /[phone] ([facility])       Patient is a 62y old  Female who presents with a chief complaint of Pyelonephritis (25 May 2022 00:16)      SUBJECTIVE / OVERNIGHT EVENTS: Pt seen and examined. Febrile to 102.7 F at 5am         MEDICATIONS  (STANDING):  dextrose 5%. 1000 milliLiter(s) (100 mL/Hr) IV Continuous <Continuous>  dextrose 5%. 1000 milliLiter(s) (50 mL/Hr) IV Continuous <Continuous>  dextrose 50% Injectable 25 Gram(s) IV Push once  dextrose 50% Injectable 12.5 Gram(s) IV Push once  dextrose 50% Injectable 25 Gram(s) IV Push once  enoxaparin Injectable 40 milliGRAM(s) SubCutaneous every 24 hours  glucagon  Injectable 1 milliGRAM(s) IntraMuscular once  insulin lispro (ADMELOG) corrective regimen sliding scale   SubCutaneous three times a day before meals  insulin lispro (ADMELOG) corrective regimen sliding scale   SubCutaneous at bedtime  lactated ringers. 1000 milliLiter(s) (100 mL/Hr) IV Continuous <Continuous>  meropenem  IVPB 1000 milliGRAM(s) IV Intermittent every 8 hours  sodium chloride 0.9% lock flush 3 milliLiter(s) IV Push every 8 hours  vancomycin  IVPB 1000 milliGRAM(s) IV Intermittent every 12 hours    MEDICATIONS  (PRN):  acetaminophen     Tablet .. 650 milliGRAM(s) Oral every 6 hours PRN Temp greater or equal to 38C (100.4F), Mild Pain (1 - 3), Moderate Pain (4 - 6)  dextrose Oral Gel 15 Gram(s) Oral once PRN Blood Glucose LESS THAN 70 milliGRAM(s)/deciliter  ondansetron Injectable 4 milliGRAM(s) IV Push every 8 hours PRN Nausea and/or Vomiting      I&O's Summary      Vital Signs Last 24 Hrs  T(C): 37.7 (25 May 2022 06:42), Max: 39.9 (24 May 2022 22:42)  T(F): 99.9 (25 May 2022 06:42), Max: 103.9 (24 May 2022 22:42)  HR: 117 (25 May 2022 05:20) (79 - 121)  BP: 181/76 (25 May 2022 05:20) (105/56 - 183/98)  BP(mean): --  RR: 20 (25 May 2022 05:20) (16 - 20)  SpO2: 93% (25 May 2022 05:20) (93% - 100%)    CAPILLARY BLOOD GLUCOSE      POCT Blood Glucose.: 287 mg/dL (25 May 2022 00:12)      PHYSICAL EXAM:  GENERAL: NAD,   HEAD:  Atraumatic, Normocephalic  EYES: EOMI, PERRL, conjunctiva and sclera clear  NECK: No JVD  CHEST/LUNG: Clear to auscultation bilaterally; No wheeze  HEART: Regular rate and rhythm; No murmurs, rubs, or gallops  ABDOMEN: Soft, Nontender, Nondistended; Bowel sounds present  EXTREMITIES:  2+ Peripheral Pulses, No clubbing, cyanosis, or edema  PSYCH: AAOx3  NEUROLOGY: non-focal  SKIN: No rashes or lesions       LABS:                        12.1   12.87 )-----------( 120      ( 24 May 2022 16:05 )             38.4     Auto Eosinophil # 0.04  / Auto Eosinophil % 0.3   / Auto Neutrophil # 9.66  / Auto Neutrophil % 75.1  / BANDS % x        05-24    132<L>  |  93<L>  |  14  ----------------------------<  180<H>  4.2   |  27  |  0.78    Ca    9.9      24 May 2022 16:05  TPro  9.9<H>  /  Alb  3.9  /  TBili  0.7  /  DBili  x   /  AST  20  /  ALT  17  /  AlkPhos  80  05-24          Urinalysis Basic - ( 24 May 2022 19:33 )    Color: Light Orange / Appearance: Turbid / S.031 / pH: x  Gluc: x / Ketone: Negative  / Bili: Negative / Urobili: <2 mg/dL   Blood: x / Protein: 30 mg/dL / Nitrite: Negative   Leuk Esterase: Large / RBC: 2 /HPF / WBC tntc /HPF   Sq Epi: x / Non Sq Epi: few /HPF / Bacteria: Moderate            RADIOLOGY & ADDITIONAL TESTS:    Imaging Personally Reviewed:    Consultant(s) Notes Reviewed:      Care Discussed with Consultants/Other Providers:   Cecille Manzano MD  PGY 2 Department of Internal Medicine  Pager: [phone] ([facility]) /[phone] ([facility])       Patient is a 62y old  Female who presents with a chief complaint of Pyelonephritis (25 May 2022 00:16)      SUBJECTIVE / OVERNIGHT EVENTS: Pt seen and examined. Febrile to 102.7 F at 5am   Endorses feeling fatigued and nauseas       MEDICATIONS  (STANDING):  dextrose 5%. 1000 milliLiter(s) (100 mL/Hr) IV Continuous <Continuous>  dextrose 5%. 1000 milliLiter(s) (50 mL/Hr) IV Continuous <Continuous>  dextrose 50% Injectable 25 Gram(s) IV Push once  dextrose 50% Injectable 12.5 Gram(s) IV Push once  dextrose 50% Injectable 25 Gram(s) IV Push once  enoxaparin Injectable 40 milliGRAM(s) SubCutaneous every 24 hours  glucagon  Injectable 1 milliGRAM(s) IntraMuscular once  insulin lispro (ADMELOG) corrective regimen sliding scale   SubCutaneous three times a day before meals  insulin lispro (ADMELOG) corrective regimen sliding scale   SubCutaneous at bedtime  lactated ringers. 1000 milliLiter(s) (100 mL/Hr) IV Continuous <Continuous>  meropenem  IVPB 1000 milliGRAM(s) IV Intermittent every 8 hours  sodium chloride 0.9% lock flush 3 milliLiter(s) IV Push every 8 hours  vancomycin  IVPB 1000 milliGRAM(s) IV Intermittent every 12 hours    MEDICATIONS  (PRN):  acetaminophen     Tablet .. 650 milliGRAM(s) Oral every 6 hours PRN Temp greater or equal to 38C (100.4F), Mild Pain (1 - 3), Moderate Pain (4 - 6)  dextrose Oral Gel 15 Gram(s) Oral once PRN Blood Glucose LESS THAN 70 milliGRAM(s)/deciliter  ondansetron Injectable 4 milliGRAM(s) IV Push every 8 hours PRN Nausea and/or Vomiting      I&O's Summary      Vital Signs Last 24 Hrs  T(C): 37.7 (25 May 2022 06:42), Max: 39.9 (24 May 2022 22:42)  T(F): 99.9 (25 May 2022 06:42), Max: 103.9 (24 May 2022 22:42)  HR: 117 (25 May 2022 05:20) (79 - 121)  BP: 181/76 (25 May 2022 05:20) (105/56 - 183/98)  BP(mean): --  RR: 20 (25 May 2022 05:20) (16 - 20)  SpO2: 93% (25 May 2022 05:20) (93% - 100%)    CAPILLARY BLOOD GLUCOSE      POCT Blood Glucose.: 287 mg/dL (25 May 2022 00:12)      PHYSICAL EXAM:  GENERAL: somnolent, weak, AAOX3   HEAD:  Atraumatic, Normocephalic  EYES: EOMI, PERRL, conjunctiva and sclera clear  CHEST/LUNG: Clear to auscultation bilaterally; No wheeze  HEART: Regular rate and rhythm; No murmurs, rubs, or gallops  ABDOMEN: CV tenderness and suprapubic tenderness   EXTREMITIES:  2+ Peripheral Pulses, No clubbing, cyanosis, or edema  PSYCH: AAOx3  NEUROLOGY: non-focal  SKIN: No rashes or lesions       LABS:                        12.1   12.87 )-----------( 120      ( 24 May 2022 16:05 )             38.4     Auto Eosinophil # 0.04  / Auto Eosinophil % 0.3   / Auto Neutrophil # 9.66  / Auto Neutrophil % 75.1  / BANDS % x        05-24    132<L>  |  93<L>  |  14  ----------------------------<  180<H>  4.2   |  27  |  0.78    Ca    9.9      24 May 2022 16:05  TPro  9.9<H>  /  Alb  3.9  /  TBili  0.7  /  DBili  x   /  AST  20  /  ALT  17  /  AlkPhos  80  05-24          Urinalysis Basic - ( 24 May 2022 19:33 )    Color: Light Orange / Appearance: Turbid / S.031 / pH: x  Gluc: x / Ketone: Negative  / Bili: Negative / Urobili: <2 mg/dL   Blood: x / Protein: 30 mg/dL / Nitrite: Negative   Leuk Esterase: Large / RBC: 2 /HPF / WBC tntc /HPF   Sq Epi: x / Non Sq Epi: few /HPF / Bacteria: Moderate            RADIOLOGY & ADDITIONAL TESTS:    Imaging Personally Reviewed:    Consultant(s) Notes Reviewed:      Care Discussed with Consultants/Other Providers:

## 2022-05-25 NOTE — PROGRESS NOTE ADULT - PROBLEM SELECTOR PLAN 6
Patient reports being on metformin 1000 mg BID. Patient does not remember other diabetic medications.  Med rec pharmacist emailed.  Will hold metformin while inpatient.  Low dose insulin sliding scale ordered.  Consistent carbohydrate diet ordered.  Hemoglobin A1c ordered with AM labs. Patient reports being on metformin 1000 mg BID. Patient does not remember other diabetic medications.  Med rec pharmacist emailed.  Will hold metformin while inpatient.  Low dose insulin sliding scale ordered.  Consistent carbohydrate diet ordered.  HgbA1c 9.3    - endocrine consult

## 2022-05-25 NOTE — PROGRESS NOTE ADULT - ATTENDING COMMENTS
63 yo F w/ hx prior UTI ( resistant Ecoli; unable to find Ucx hx of PCN allergy) p/w b/l flank pain and fever and CT A/P with concern for b/l pyelonephritis. UA+     Sepsis secondary to UTI- f/u Cx                                      - cont meropenem/vanco for now;                                       - repeat lactate  DM- check A1c; hold oral hypoglycemics; unclear why on minced and moist speech/swallow eval; cont on ISS for now given appetite maybe poor due to sepsis.   Dizziness- check orthostatics; cont LR  thrombocytopenia- likely in setting of sepsis; monitor for now   hyponatremia- cont IVF; monitor Na     Dispo: PT consult

## 2022-05-25 NOTE — CONSULT NOTE ADULT - CONSULT REQUESTED DATE/TIME
25-May-2022 16:15
25-May-2022 15:04
Post-Care Instructions: I reviewed with the patient in detail post-care instructions. Patient is to keep the biopsy site dry overnight, and then apply bacitracin twice daily until healed. Patient may apply hydrogen peroxide soaks to remove any crusting.

## 2022-05-25 NOTE — H&P ADULT - PROBLEM SELECTOR PLAN 6
Patient with Na of 132.  Likely in setting of poor PO intake.  Continue to trend. Patient reports being on metformin 1000 mg BID. Patient does not remember other diabetic medications.  Med rec pharmacist emailed.  Will hold metformin while inpatient.  Low dose insulin sliding scale ordered.  Consistent carbohydrate diet ordered.  Hemoglobin A1c ordered with AM labs.

## 2022-05-25 NOTE — CONSULT NOTE ADULT - ASSESSMENT
61 yo woman with diabetes, h/o UTI presented 5/24 with chills, nausea, flank pain found to have leucocytosis, pyuria, gram negative rods in blood and CT evidence pyelonephritis    h/o allergy to penicillin    Pyelonephritis, gram negative sepsis/ bacteremia, leucocytosis    Suggest:    follow final identification and sensitivities of gram neg in blood  follow urine culture  agree with meropenem 1 gm iv q 8 h    will follow 
A/P: 62F history of uncontrolled type 2 diabetes (IsZ4t=2.3%) a/w sepsis secondary to UTI/pyelonephritis, on IV antibiotics.    #type 2 diabetes  -goal FS range 100-180  -infection with pyelonephritis likely contributing to elevated FS  -would start weight based basal insulin to help get FS into target range; recommend Lantus 10 units at bedtime  -would hold off on standing pre-meal insulin until patient resumes eating regularly again  -continue LOW pre-meal and bedtime supplemental insulin  -continue to hold all oral diabetes medications    d/c planning:  -hopefully patient will feel better tomorrow and we can further discuss home diabetes regimen-appears she was on 3 oral agents and had been prescribed pre-meal insulin but some had not been filled for past few months so need to verify what patient was/was not taking  -if having hypoglycemic episodes at home, will consider d/c glimepiride if patient had been taking it and possibly start basal insulin  -need to discuss tomorrow if patient would like to f/u with endo as outpatient     #thyroid  -TSH in normal range; nothing to address    Petra Jean MD  Division of Endocrinology    If after 6PM or before 9AM, or on weekends/holidays, please call endocrine answering service for assistance ([phone]).  For nonurgent matters email [email or web address] for assistance.

## 2022-05-25 NOTE — PROGRESS NOTE ADULT - PROBLEM SELECTOR PLAN 2
Patient meets sepsis criteria due to fever and leukocytosis.  Follow up blood and urine cultures.  Vitals q4h.  Patient on LR at 100 ml/hr for 10 hours.  Monitor closely. Patient meets sepsis criteria due to fever and leukocytosis.  Sepsis 2/2 E Coli bacteremia and UTI  Blood cx 5/24 positive for E Ecoli   Vitals q4h.  s/p 3L IVF   Monitor closely.  On alysia ad Vanco     - Lactate improve to 2  - will give additional LR 1200mL and repeat lactate in AM  - follow blood cx sensitivities and urine cx  - ID consulted

## 2022-05-25 NOTE — H&P ADULT - HISTORY OF PRESENT ILLNESS
63 y/o M with PMH of Type 2 Diabetes on Metformin 63 y/o M with PMH of Type 2 Diabetes on Metformin, Hypertension on Ramipril presents to the ED complaining of bilateral flank and lower abdominal pain. Patient states pain has been occurring for 4 days. Patient reports pain has worsened. Patient endorses flank pain with radiation to abdomen. Patient also endorses dysuria, nausea and vomiting as well as fever and chills. Patient reports poor PO intake. Of note patient reports having that she has had UTI since March. Patient reports that she hospitalized at [facility] for 2 weeks and received antibiotics. Patient reports that when she was discharged she was feeling better. Patient reports that symptoms returned later and endorses poor PO intake since March. Patient endorses 25 pound weight loss since March. Patient lives on her own and reports ambulating with cane due to dizziness. Per ED note' patient's last urine culture grew e-coli resistant to Levaquin ,bactrim and Cipro Patient was prescribed Macrobid however patient reports allergic reaction of nausea and vomiting.    In ED CTAP showed Heterogeneous enhancement of the bilateral kidneys, suspicious for pyelonephritis. UA showed large leukocytosis. Patient received vancomycin and aztreonam in ED. Urine and blood cultures were sent. 61 y/o M with PMH of Type 2 Diabetes on Metformin, Hypertension on Ramipril presents to the ED complaining of bilateral flank and lower abdominal pain. Patient states pain has been occurring for 4 days. Patient reports pain has worsened. Patient endorses flank pain with radiation to abdomen. Patient also endorses dysuria, nausea and vomiting as well as fever and chills. Patient reports poor PO intake. Of note patient reports having that she has had UTI since March. Patient reports that she hospitalized at [facility] for 2 weeks and received antibiotics. Patient reports that when she was discharged she was feeling better. Patient reports that symptoms returned later and endorses poor PO intake since March. Patient endorses 25 pound weight loss since March. Patient lives on her own and reports ambulating with cane due to dizziness. Per ED note' patient's last urine culture grew e-coli resistant to Levaquin ,bactrim and Cipro. Patient was prescribed Macrobid however patient reports allergic reaction of nausea and vomiting.    In ED CTAP showed Heterogeneous enhancement of the bilateral kidneys, suspicious for pyelonephritis. UA showed large leukocytosis. Patient received vancomycin and aztreonam in ED. Urine and blood cultures were sent.

## 2022-05-25 NOTE — PROGRESS NOTE ADULT - PROBLEM SELECTOR PLAN 5
Patient with platelet count of 120. Platelet count 137 in February 2017.  Will order platelet blue top count.  Continue trending. Patient with platelet count of 120. Platelet count 137 in February 2017.  Will order platelet blue top count- true thrombocytopenia   Continue trending.    - likely in setting of sepsis

## 2022-05-25 NOTE — H&P ADULT - PROBLEM SELECTOR PLAN 8
Patient lives by herself and does not have assistance at home.  Social work consult for possible HHA. Patient endorsing 25 pound weight loss in the last 2 months.  Patient endorses poor intake.  Encourage PO intake.  Consider dietetica consult. Patient endorsing 25 pound weight loss in the last 2 months.  Patient endorses poor intake.  Encourage PO intake.

## 2022-05-25 NOTE — H&P ADULT - PROBLEM SELECTOR PLAN 7
Patient endorsing 25 pound weight loss in the last 2 months.  Patient endorses poor intake.  Encourage PO intake.  Consider dietetica consult. Patient with Na of 132.  Likely in setting of poor PO intake.  Continue to trend.

## 2022-05-25 NOTE — PROGRESS NOTE ADULT - PROBLEM SELECTOR PLAN 4
Patient reporting dizziness since march.  Possibly in setting of poor PO intake.  Orthostatics ordered.  Patient on LR at 100 ml/hr for 10 hours.  If no improvement consider Echo. Patient reporting dizziness since march.  Possibly in setting of poor PO intake.  Orthostatics ordered.  Patient on LR at 100 ml/hr for 10 hours.  If no improvement consider Echo.    - patient refused orthostatics 5/25

## 2022-05-25 NOTE — PROGRESS NOTE ADULT - PROBLEM SELECTOR PLAN 1
Admit to medicine, continue monitoring.  Hx of recurrent UTI   CTAP w/IV contrast showing Heterogeneous enhancement of the bilateral kidneys, suspicious for   pyelonephritis. Correlate with urinalysis.  UA showing large leukocyte esterase.  Per ED note' patient's last urine culture grew e-coli resistant to Levaquin ,bactrim and Cipro.  Patient s/p aztreonam and vancomycin in ED.  - Patient started on Meropenem and Vanco   - Follow up Urine culture.  - Adjust abc accordingly Admit to medicine, continue monitoring.  Hx of recurrent UTI   CTAP w/IV contrast showing Heterogeneous enhancement of the bilateral kidneys, suspicious for   pyelonephritis. Correlate with urinalysis.  UA showing large leukocyte esterase.  Per ED note' patient's last urine culture grew e-coli resistant to Levaquin ,bactrim and Cipro.  Patient s/p aztreonam and vancomycin in ED.  - Patient started on Meropenem and Vanco   - Follow up Urine culture.  - Adjust abx accordingly  - ID consulted for abx recs

## 2022-05-25 NOTE — PROGRESS NOTE ADULT - PROBLEM SELECTOR PLAN 3
Patient initially with lactate of 2.2-->4.1.  Patient s/p 2L of LR.  Patient started on LR at 100 ml/hr for 10 hours.    - Follow up AM blood gas. Patient initially with lactate of 2.2-->4.1.  Patient s/p 3L LR    - LR 1200 mL and trend VBG lactate

## 2022-05-25 NOTE — H&P ADULT - PROBLEM SELECTOR PLAN 9
Lovenox 40 mg subcutaneous daily. Med rec pharmacist emailed regarding medications. Patient lives by herself and does not have assistance at home.  Social work consult for possible HHA.

## 2022-05-25 NOTE — CONSULT NOTE ADULT - SUBJECTIVE AND OBJECTIVE BOX
HPI:  61 y/o M with PMH of Type 2 Diabetes on Metformin, Hypertension on Ramipril presents to the ED complaining of bilateral flank and lower abdominal pain. Patient states pain has been occurring for 4 days. Patient reports pain has worsened. Patient endorses flank pain with radiation to abdomen. Patient also endorses dysuria, nausea and vomiting as well as fever and chills. Patient reports poor PO intake. Of note patient reports having that she has had UTI since March. Patient reports that she hospitalized at [facility] for 2 weeks and received antibiotics. Patient reports that when she was discharged she was feeling better. Patient reports that symptoms returned later and endorses poor PO intake since March. Patient endorses 25 pound weight loss since March. Patient lives on her own and reports ambulating with cane due to dizziness. Per ED note' patient's last urine culture grew e-coli resistant to Levaquin ,bactrim and Cipro. Patient was prescribed Macrobid however patient reports allergic reaction of nausea and vomiting.    In ED CTAP showed Heterogeneous enhancement of the bilateral kidneys, suspicious for pyelonephritis. UA showed large leukocytosis. Patient received vancomycin and aztreonam in ED. Urine and blood cultures were sent. (25 May 2022 00:16)    ID- continues to feel nauseous and weak "not myself"      PAST MEDICAL & SURGICAL HISTORY:  DM (diabetes mellitus)      Hypertension      History of appendectomy      S/P appendectomy          Allergies    codeine (Nausea)  penicillin (Nausea)    Intolerances        ANTIMICROBIALS:  meropenem  IVPB 1000 every 8 hours  vancomycin  IVPB 1000 every 12 hours      OTHER MEDS:  acetaminophen     Tablet .. 650 milliGRAM(s) Oral every 6 hours PRN  dextrose 5%. 1000 milliLiter(s) IV Continuous <Continuous>  dextrose 5%. 1000 milliLiter(s) IV Continuous <Continuous>  dextrose 50% Injectable 25 Gram(s) IV Push once  dextrose 50% Injectable 12.5 Gram(s) IV Push once  dextrose 50% Injectable 25 Gram(s) IV Push once  dextrose Oral Gel 15 Gram(s) Oral once PRN  enoxaparin Injectable 40 milliGRAM(s) SubCutaneous every 24 hours  glucagon  Injectable 1 milliGRAM(s) IntraMuscular once  insulin lispro (ADMELOG) corrective regimen sliding scale   SubCutaneous three times a day before meals  insulin lispro (ADMELOG) corrective regimen sliding scale   SubCutaneous at bedtime  lactated ringers. 1000 milliLiter(s) IV Continuous <Continuous>  lactated ringers. 1000 milliLiter(s) IV Continuous <Continuous>  ondansetron Injectable 4 milliGRAM(s) IV Push every 8 hours PRN  ondansetron Injectable 4 milliGRAM(s) IV Push every 8 hours PRN  sodium chloride 0.9% lock flush 3 milliLiter(s) IV Push every 8 hours      SOCIAL HISTORY:    FAMILY HISTORY:  FH: type 2 diabetes    FH: hypertension        REVIEW OF SYSTEMS  [  ] ROS unobtainable because:    [  ] All other systems negative except as noted below:	    Constitutional:  [ ] fever [ ] chills  [ ] weight loss  [x ] weakness  Skin:  [ ] rash [ ] phlebitis	  Eyes: [ ] icterus [ ] pain  [ ] discharge	  ENMT: [ ] sore throat  [ ] thrush [ ] ulcers [ ] exudates  Respiratory: [ ] dyspnea [ ] hemoptysis [ ] cough [ ] sputum	  Cardiovascular:  [ ] chest pain [ ] palpitations [ ] edema	  Gastrointestinal:  [ ] nausea [ ] vomiting [ ] diarrhea [ ] constipation [ ] pain	  Genitourinary:  [ ] dysuria [ ] frequency [ ] hematuria [ ] discharge [x ] flank pain  [ ] incontinence  Musculoskeletal:  [ ] myalgias [ ] arthralgias [ ] arthritis  [ ] back pain  Neurological:  [ ] headache [ ] seizures  [ ] confusion/altered mental status  Psychiatric:  [ ] anxiety [ ] depression	  Hematology/Lymphatics:  [ ] lymphadenopathy  Endocrine:  [ ] adrenal [ ] thyroid  Allergic/Immunologic:	 [ ] transplant [ ] seasonal    PHYSICAL EXAM:  Constitutional: ill appearing  Eyes: No icterus.  Oral cavity: Clear, no lesions  Neck: Supple  RS: Chest clear   CVS: S1, S2 heard  Abdomen: Soft. No guarding/rigidity/tenderness.  : CVA tender  Skin: No lesions noted  Vascular: No evidence of phlebitis  Neuro: Alert, oriented to time/place/person  Cranial nerves 2-12 grossly normal. No focal abnormalities    Drug Dosing Weight    Weight (kg): 60.2 (25 May 2022 01:06)    Vital Signs Last 24 Hrs  T(F): 100.4 (22 @ 12:30), Max: 103.9 (22 @ 22:42)    Vital Signs Last 24 Hrs  HR: 103 (22 @ 12:30) (79 - 121)  BP: 160/69 (22 @ 12:30) (105/56 - 183/98)  RR: 19 (22 @ 11:00)  SpO2: 100% (22 @ 11:00) (93% - 100%)  Wt(kg): --                          10.6   10.93 )-----------( 86       ( 25 May 2022 06:15 )             33.1           131<L>  |  96<L>  |  12  ----------------------------<  219<H>  4.0   |  24  |  0.76    Ca    8.8      25 May 2022 06:15  Phos  2.3       Mg     1.40         TPro  9.9<H>  /  Alb  3.9  /  TBili  0.7  /  DBili  x   /  AST  20  /  ALT  17  /  AlkPhos  80        Urinalysis Basic - ( 24 May 2022 19:33 )    Color: Light Orange / Appearance: Turbid / S.031 / pH: x  Gluc: x / Ketone: Negative  / Bili: Negative / Urobili: <2 mg/dL   Blood: x / Protein: 30 mg/dL / Nitrite: Negative   Leuk Esterase: Large / RBC: 2 /HPF / WBC tntc /HPF   Sq Epi: x / Non Sq Epi: few /HPF / Bacteria: Moderate        MICROBIOLOGY:  .Blood Blood-Peripheral  22   Growth in aerobic bottle: Gram Negative Rods  --  Blood Culture PCR        Rapid RVP Result: NotDetec ( @ 16:05)          RADIOLOGY:    r< from: CT Abdomen and Pelvis w/ IV Cont (22 @ 18:52) >    ACC: [id number] EXAM:  CT ABDOMEN AND PELVIS IC                          PROCEDURE DATE:  2022          INTERPRETATION:  CLINICAL INFORMATION: Left lower quadrant tenderness to   palpation, abdominal pain, nausea.    COMPARISON: None.    CONTRAST/COMPLICATIONS:  IV Contrast: Omnipaque 350. 60 cc administered. 10 cc discarded.  Oral Contrast: None.  Complications: None.    PROCEDURE:  CT of the Abdomen and Pelvis was performed.  Sagittal and coronal reformats were performed.    FINDINGS:  LOWER CHEST: Within normal limits.    LIVER: Within normal limits.  BILE DUCTS: Normal caliber.  GALLBLADDER: Within normal limits.  SPLEEN: Within normal limits.  PANCREAS: Within normal limits.  ADRENALS: Within normal limits.  KIDNEYS/URETERS: Heterogeneous enhancement of the bilateral kidneys. No   renal stones or hydronephrosis.    BLADDER: Within normal limits.  REPRODUCTIVE ORGANS: Uterus and adnexa within normal limits.    BOWEL: Colonic diverticulosis. No bowel obstruction. Appendectomy.  PERITONEUM: No ascites.  VESSELS: Atherosclerotic changes.  RETROPERITONEUM/LYMPH NODES: No lymphadenopathy.  ABDOMINAL WALL: Within normal limits.  BONES: Degenerative changes.    IMPRESSION:  Heterogeneous enhancement of the bilateral kidneys, suspicious for   pyelonephritis. Correlate with urinalysis.    --- End of Report ---          REYNALDO HERNANDEZ MD; Resident Radiology  This document has been electronically signed.  JOEY MONZON MD; Attending Radiologist  This document has been electronically signed. May 24 2022  7:24PM    < end of copied text >

## 2022-05-25 NOTE — H&P ADULT - PROBLEM SELECTOR PLAN 3
Patient initially with lactate of 2.2-->4.1.  Patient s/p 2L of LR.  Patient started on LR at 100 ml/hr for 10 hours.  Follow up AM blood gas.

## 2022-05-25 NOTE — H&P ADULT - ASSESSMENT
61 y/o M with PMH of Type 2 Diabetes on Metformin, Hypertension on Ramipril presents to the ED complaining of bilateral flank and lower abdominal pain admitted for pyelonephritis.

## 2022-05-25 NOTE — PHARMACOTHERAPY INTERVENTION NOTE - COMMENTS
Medication list in OMR updated with fill information from [facility].   Of Note:  - Glimepiride 4mg (1 tab PO BID): last dispensed in Dec/2021 x 3 months supply  - Admelog last dispensed in March/2022 x 1 month supply

## 2022-05-25 NOTE — H&P ADULT - PROBLEM SELECTOR PLAN 5
Patient with platelet count of 120. Platelet count 137 in February 2017.  Will order platelet blue top count.  Continue trending.

## 2022-05-25 NOTE — PROGRESS NOTE ADULT - PROBLEM SELECTOR PLAN 11
Lovenox 40 mg subcutaneous daily. Lovenox 40 mg subcutaneous daily.    Diet: DASH/CC mechanical soft  Speech and swallow evaluation

## 2022-05-26 LAB
ALBUMIN SERPL ELPH-MCNC: 3.1 G/DL — LOW (ref 3.3–5)
ALP SERPL-CCNC: 80 U/L — SIGNIFICANT CHANGE UP (ref 40–120)
ALT FLD-CCNC: 17 U/L — SIGNIFICANT CHANGE UP (ref 4–33)
ANION GAP SERPL CALC-SCNC: 11 MMOL/L — SIGNIFICANT CHANGE UP (ref 7–14)
AST SERPL-CCNC: 22 U/L — SIGNIFICANT CHANGE UP (ref 4–32)
BASE EXCESS BLDV CALC-SCNC: 3.5 MMOL/L — HIGH (ref -2–3)
BASE EXCESS BLDV CALC-SCNC: 4.2 MMOL/L — HIGH (ref -2–3)
BILIRUB SERPL-MCNC: 0.5 MG/DL — SIGNIFICANT CHANGE UP (ref 0.2–1.2)
BLOOD GAS VENOUS COMPREHENSIVE RESULT: SIGNIFICANT CHANGE UP
BLOOD GAS VENOUS COMPREHENSIVE RESULT: SIGNIFICANT CHANGE UP
BUN SERPL-MCNC: 10 MG/DL — SIGNIFICANT CHANGE UP (ref 7–23)
C PEPTIDE SERPL-MCNC: 7.4 NG/ML — HIGH (ref 1.1–4.4)
CALCIUM SERPL-MCNC: 9.1 MG/DL — SIGNIFICANT CHANGE UP (ref 8.4–10.5)
CHLORIDE BLDV-SCNC: 98 MMOL/L — SIGNIFICANT CHANGE UP (ref 96–108)
CHLORIDE BLDV-SCNC: 98 MMOL/L — SIGNIFICANT CHANGE UP (ref 96–108)
CHLORIDE SERPL-SCNC: 97 MMOL/L — LOW (ref 98–107)
CO2 BLDV-SCNC: 31.5 MMOL/L — HIGH (ref 22–26)
CO2 BLDV-SCNC: 32.8 MMOL/L — HIGH (ref 22–26)
CO2 SERPL-SCNC: 27 MMOL/L — SIGNIFICANT CHANGE UP (ref 22–31)
CREAT SERPL-MCNC: 0.81 MG/DL — SIGNIFICANT CHANGE UP (ref 0.5–1.3)
EGFR: 83 ML/MIN/1.73M2 — SIGNIFICANT CHANGE UP
GAS PNL BLDV: 133 MMOL/L — LOW (ref 136–145)
GAS PNL BLDV: 134 MMOL/L — LOW (ref 136–145)
GLUCOSE BLDV-MCNC: 154 MG/DL — HIGH (ref 70–99)
GLUCOSE BLDV-MCNC: 155 MG/DL — HIGH (ref 70–99)
GLUCOSE SERPL-MCNC: 157 MG/DL — HIGH (ref 70–99)
GRAM STN FLD: SIGNIFICANT CHANGE UP
GRAM STN FLD: SIGNIFICANT CHANGE UP
HCO3 BLDV-SCNC: 30 MMOL/L — HIGH (ref 22–29)
HCO3 BLDV-SCNC: 31 MMOL/L — HIGH (ref 22–29)
HCT VFR BLD CALC: 35.3 % — SIGNIFICANT CHANGE UP (ref 34.5–45)
HCT VFR BLDA CALC: 33 % — LOW (ref 34.5–46.5)
HGB BLD CALC-MCNC: 11.1 G/DL — LOW (ref 11.5–15.5)
HGB BLD-MCNC: 11.1 G/DL — LOW (ref 11.5–15.5)
LACTATE BLDV-MCNC: 2.4 MMOL/L — HIGH (ref 0.5–2)
LACTATE BLDV-MCNC: 2.4 MMOL/L — HIGH (ref 0.5–2)
LACTATE SERPL-SCNC: 1.8 MMOL/L — SIGNIFICANT CHANGE UP (ref 0.5–2)
MAGNESIUM SERPL-MCNC: 2 MG/DL — SIGNIFICANT CHANGE UP (ref 1.6–2.6)
MCHC RBC-ENTMCNC: 27.5 PG — SIGNIFICANT CHANGE UP (ref 27–34)
MCHC RBC-ENTMCNC: 31.4 GM/DL — LOW (ref 32–36)
MCV RBC AUTO: 87.6 FL — SIGNIFICANT CHANGE UP (ref 80–100)
MRSA PCR RESULT.: DETECTED
NRBC # BLD: 0 /100 WBCS — SIGNIFICANT CHANGE UP
NRBC # FLD: 0 K/UL — SIGNIFICANT CHANGE UP
PCO2 BLDV: 53 MMHG — HIGH (ref 39–42)
PCO2 BLDV: 55 MMHG — HIGH (ref 39–42)
PH BLDV: 7.36 — SIGNIFICANT CHANGE UP (ref 7.32–7.43)
PH BLDV: 7.36 — SIGNIFICANT CHANGE UP (ref 7.32–7.43)
PHOSPHATE SERPL-MCNC: 2.3 MG/DL — LOW (ref 2.5–4.5)
PLATELET # BLD AUTO: 111 K/UL — LOW (ref 150–400)
PO2 BLDV: 28 MMHG — SIGNIFICANT CHANGE UP
PO2 BLDV: <20 MMHG — SIGNIFICANT CHANGE UP
POTASSIUM BLDV-SCNC: 3.3 MMOL/L — LOW (ref 3.5–5.1)
POTASSIUM BLDV-SCNC: 3.3 MMOL/L — LOW (ref 3.5–5.1)
POTASSIUM SERPL-MCNC: 3.4 MMOL/L — LOW (ref 3.5–5.3)
POTASSIUM SERPL-SCNC: 3.4 MMOL/L — LOW (ref 3.5–5.3)
PROT SERPL-MCNC: 8.5 G/DL — HIGH (ref 6–8.3)
RBC # BLD: 4.03 M/UL — SIGNIFICANT CHANGE UP (ref 3.8–5.2)
RBC # FLD: 14.9 % — HIGH (ref 10.3–14.5)
S AUREUS DNA NOSE QL NAA+PROBE: DETECTED
SAO2 % BLDV: 23.4 % — SIGNIFICANT CHANGE UP
SAO2 % BLDV: 42.5 % — SIGNIFICANT CHANGE UP
SODIUM SERPL-SCNC: 135 MMOL/L — SIGNIFICANT CHANGE UP (ref 135–145)
VANCOMYCIN TROUGH SERPL-MCNC: 56.5 UG/ML — CRITICAL HIGH (ref 10–20)
WBC # BLD: 10.66 K/UL — HIGH (ref 3.8–10.5)
WBC # FLD AUTO: 10.66 K/UL — HIGH (ref 3.8–10.5)

## 2022-05-26 PROCEDURE — 99233 SBSQ HOSP IP/OBS HIGH 50: CPT | Mod: GC

## 2022-05-26 PROCEDURE — 99232 SBSQ HOSP IP/OBS MODERATE 35: CPT

## 2022-05-26 RX ORDER — SODIUM CHLORIDE 9 MG/ML
1000 INJECTION, SOLUTION INTRAVENOUS
Refills: 0 | Status: DISCONTINUED | OUTPATIENT
Start: 2022-05-26 | End: 2022-05-27

## 2022-05-26 RX ORDER — ACETAMINOPHEN 500 MG
1000 TABLET ORAL ONCE
Refills: 0 | Status: COMPLETED | OUTPATIENT
Start: 2022-05-26 | End: 2022-05-26

## 2022-05-26 RX ORDER — INSULIN GLARGINE 100 [IU]/ML
12 INJECTION, SOLUTION SUBCUTANEOUS AT BEDTIME
Refills: 0 | Status: DISCONTINUED | OUTPATIENT
Start: 2022-05-26 | End: 2022-05-31

## 2022-05-26 RX ORDER — POTASSIUM CHLORIDE 20 MEQ
20 PACKET (EA) ORAL ONCE
Refills: 0 | Status: COMPLETED | OUTPATIENT
Start: 2022-05-26 | End: 2022-05-26

## 2022-05-26 RX ORDER — MUPIROCIN 20 MG/G
1 OINTMENT TOPICAL
Refills: 0 | Status: COMPLETED | OUTPATIENT
Start: 2022-05-26 | End: 2022-05-31

## 2022-05-26 RX ADMIN — Medication 250 MILLIGRAM(S): at 10:39

## 2022-05-26 RX ADMIN — Medication 2: at 18:41

## 2022-05-26 RX ADMIN — MEROPENEM 100 MILLIGRAM(S): 1 INJECTION INTRAVENOUS at 05:52

## 2022-05-26 RX ADMIN — SODIUM CHLORIDE 100 MILLILITER(S): 9 INJECTION, SOLUTION INTRAVENOUS at 12:48

## 2022-05-26 RX ADMIN — Medication 1: at 22:30

## 2022-05-26 RX ADMIN — SODIUM CHLORIDE 3 MILLILITER(S): 9 INJECTION INTRAMUSCULAR; INTRAVENOUS; SUBCUTANEOUS at 21:26

## 2022-05-26 RX ADMIN — INSULIN GLARGINE 12 UNIT(S): 100 INJECTION, SOLUTION SUBCUTANEOUS at 22:30

## 2022-05-26 RX ADMIN — Medication 20 MILLIEQUIVALENT(S): at 12:48

## 2022-05-26 RX ADMIN — Medication 650 MILLIGRAM(S): at 18:08

## 2022-05-26 RX ADMIN — Medication 400 MILLIGRAM(S): at 05:51

## 2022-05-26 RX ADMIN — Medication 2: at 12:20

## 2022-05-26 RX ADMIN — Medication 1000 MILLIGRAM(S): at 06:21

## 2022-05-26 RX ADMIN — SODIUM CHLORIDE 3 MILLILITER(S): 9 INJECTION INTRAMUSCULAR; INTRAVENOUS; SUBCUTANEOUS at 06:31

## 2022-05-26 RX ADMIN — ENOXAPARIN SODIUM 40 MILLIGRAM(S): 100 INJECTION SUBCUTANEOUS at 05:52

## 2022-05-26 RX ADMIN — Medication 2: at 09:31

## 2022-05-26 RX ADMIN — MEROPENEM 100 MILLIGRAM(S): 1 INJECTION INTRAVENOUS at 21:49

## 2022-05-26 NOTE — PROGRESS NOTE ADULT - PROBLEM SELECTOR PLAN 9
Patient lives by herself and does not have assistance at home.  Social work consult for possible HHA. Patient lives by herself and does not have assistance at home.  Social work consult for possible HHA, however PT reccommending rehab- will plan to set up after then

## 2022-05-26 NOTE — PROVIDER CONTACT NOTE (OTHER) - BACKGROUND
Pt admitted for UTI and pyelonephritis
pt admitted with increased bilirubin. hx of DM and hypertension
Pt admitted for UTI and pyelonephritis
pt admittted with UTI abdominal pain and n/v.
pt admitted with increased bilirubin hx of dm and htn

## 2022-05-26 NOTE — SWALLOW BEDSIDE ASSESSMENT ADULT - COMMENTS
Medicine Note 5/26/2022 - 63 y/o M with PMH of Type 2 Diabetes on Metformin, Hypertension on Ramipril presents to the ED complaining of bilateral flank and lower abdominal pain admitted for pyelonephritis.    Patient seen at bedside, alert and oriented. Patient is able to follow commands and express self at the conversational level. Patient offers no c/o difficulty swallowing; denies odynophagia. Medicine Note 5/26/2022 - 63 y/o M with PMH of Type 2 Diabetes on Metformin, Hypertension on Ramipril presents to the ED complaining of bilateral flank and lower abdominal pain admitted for pyelonephritis.    CXR 5/24/2022 - Clear Lungs.     Patient seen at bedside, alert and oriented. Patient is able to follow commands and express self at the conversational level. Patient offers no c/o difficulty swallowing; denies odynophagia.

## 2022-05-26 NOTE — PROGRESS NOTE ADULT - ASSESSMENT
A/P: 62F history of uncontrolled type 2 diabetes (IeP1n=4.3%) a/w sepsis secondary to UTI/pyelonephritis, on IV antibiotics.    #type 2 diabetes  -goal FS range 100-180  -infection with pyelonephritis likely contributing to elevated FS  -FS still above goal; would increase Lantus to 12 units at bedtime  -would hold off on standing pre-meal insulin until patient resumes eating regularly again  -continue LOW pre-meal and bedtime supplemental insulin  -continue to hold all oral diabetes medications    d/c planning:  -patient said at home she was taking Actos 30qd and metformin 1000mg bid. Said she had been taking off glimepiride because her A1c was in mid 6's in Jan before she started having UTI. She was also started on Jardiance in fall of 2021 which changed the smell of her urine and is how she feels she developed the UTIs-she is now off Jardiance. Given hx of UTIs and pyelonephritis, would avoid future use of SGLT2 inhibitors and discussed with patient.   -patient would like to go home on her Actos 30qd and metformin 1000mg bid and will have f/u with her PCP who cares for her DM2. If after resolution of her infection her HbA1c remains elevated, she will likely restart glimepiride (prior dose was 4mg BID)    #thyroid  -TSH in normal range    Petra Jean MD  Division of Endocrinology    If after 6PM or before 9AM, or on weekends/holidays, please call endocrine answering service for assistance ([phone]).  For nonurgent matters email [email or web address] for assistance.

## 2022-05-26 NOTE — PROVIDER CONTACT NOTE (OTHER) - SITUATION
Pt with a vanco troph of 56.5
pt with a low bp of 99/48, pt denies sob and dizziness at this time. remains A&Ox4, ambulatory
Pt afebrile but complaining of body aches and chills
Pt BP:116/43, HR:121
pt with a temp of 100.3

## 2022-05-26 NOTE — PROVIDER CONTACT NOTE (OTHER) - ASSESSMENT
pt stable at this time. denies sob and chest pain
pt with a low bp of 99/48, pt denies sob and dizziness at this time. remains A&Ox4, ambulatory
Pt asymptomatic
Pt temperature 99 orally, pt shaking, reporting chills and body aches
pt remains stable at this time

## 2022-05-26 NOTE — PROGRESS NOTE ADULT - ASSESSMENT
63 y/o M with PMH of Type 2 Diabetes on Metformin, Hypertension on Ramipril presents to the ED complaining of bilateral flank and lower abdominal pain admitted for pyelonephritis. 61 y/o M with PMH of Type 2 Diabetes on Metformin, Hypertension on Ramipril presents to the ED complaining of bilateral flank and lower abdominal pain admitted for pyelonephritis on meropenem

## 2022-05-26 NOTE — PROGRESS NOTE ADULT - SUBJECTIVE AND OBJECTIVE BOX
History: Feeling much better than yesterday-standing up, washing off in room. Still does not have much of an appetite and is not eating. At home she follows a diabetes-healthy diet.    MEDICATIONS  (STANDING):  dextrose 5%. 1000 milliLiter(s) (100 mL/Hr) IV Continuous <Continuous>  dextrose 5%. 1000 milliLiter(s) (50 mL/Hr) IV Continuous <Continuous>  dextrose 50% Injectable 25 Gram(s) IV Push once  dextrose 50% Injectable 12.5 Gram(s) IV Push once  dextrose 50% Injectable 25 Gram(s) IV Push once  enoxaparin Injectable 40 milliGRAM(s) SubCutaneous every 24 hours  glucagon  Injectable 1 milliGRAM(s) IntraMuscular once  insulin glargine Injectable (LANTUS) 10 Unit(s) SubCutaneous at bedtime  insulin lispro (ADMELOG) corrective regimen sliding scale   SubCutaneous three times a day before meals  insulin lispro (ADMELOG) corrective regimen sliding scale   SubCutaneous at bedtime  lactated ringers. 1000 milliLiter(s) (100 mL/Hr) IV Continuous <Continuous>  lactated ringers. 1000 milliLiter(s) (100 mL/Hr) IV Continuous <Continuous>  lactated ringers. 1000 milliLiter(s) (100 mL/Hr) IV Continuous <Continuous>  meropenem  IVPB 1000 milliGRAM(s) IV Intermittent every 8 hours  mupirocin 2% Ointment 1 Application(s) Both Nostrils two times a day  sodium chloride 0.9% lock flush 3 milliLiter(s) IV Push every 8 hours    MEDICATIONS  (PRN):  acetaminophen     Tablet .. 650 milliGRAM(s) Oral every 6 hours PRN Temp greater or equal to 38C (100.4F), Mild Pain (1 - 3), Moderate Pain (4 - 6)  dextrose Oral Gel 15 Gram(s) Oral once PRN Blood Glucose LESS THAN 70 milliGRAM(s)/deciliter  ondansetron Injectable 4 milliGRAM(s) IV Push every 8 hours PRN Nausea and/or Vomiting  ondansetron Injectable 4 milliGRAM(s) IV Push every 8 hours PRN Nausea and/or Vomiting      Allergies    codeine (Nausea)  penicillin (Nausea)      Review of Systems:  +rigors this am  +tenderness in back  +feels stronger today      PHYSICAL EXAM:  VITALS: T(C): 36.8 (05-26-22 @ 13:00)  T(F): 98.3 (05-26-22 @ 13:00), Max: 101.5 (05-25-22 @ 18:00)  HR: 82 (05-26-22 @ 13:00) (65 - 96)  BP: 125/55 (05-26-22 @ 13:00) (99/48 - 142/67)  RR:  (18 - 18)  SpO2:  (94% - 100%)    GENERAL: NAD, standing changing gown and washing off  HEENT:  Atraumatic, Normocephalic  RESPIRATORY: Clear to auscultation bilaterally  GI: Soft, nontender, non distended  Back: +CVA tenderness b/l  SKIN: Dry, intact  MUSCULOSKELETAL: moving all extremities  NEURO: grossly intact      CAPILLARY BLOOD GLUCOSE      POCT Blood Glucose.: 221 mg/dL (26 May 2022 12:01)  POCT Blood Glucose.: 201 mg/dL (26 May 2022 08:43)  POCT Blood Glucose.: 195 mg/dL (25 May 2022 22:50)  POCT Blood Glucose.: 250 mg/dL (25 May 2022 17:44)      05-26    135  |  97<L>  |  10  ----------------------------<  157<H>  3.4<L>   |  27  |  0.81    eGFR: 83    Ca    9.1      05-26  Mg     2.00     05-26  Phos  2.3     05-26    TPro  8.5<H>  /  Alb  3.1<L>  /  TBili  0.5 /  AST  22  /  ALT  17  /  AlkPhos  80  05-26          Thyroid Function Tests:  05-25 @ 06:15 TSH 1.14

## 2022-05-26 NOTE — PROGRESS NOTE ADULT - PROBLEM SELECTOR PLAN 1
Admit to medicine, continue monitoring.  Hx of recurrent UTI   CTAP w/IV contrast showing Heterogeneous enhancement of the bilateral kidneys, suspicious for   pyelonephritis. Correlate with urinalysis.  UA showing large leukocyte esterase.  Per ED note' patient's last urine culture grew e-coli resistant to Levaquin ,bactrim and Cipro.  Patient s/p aztreonam and vancomycin in ED.  - Patient started on Meropenem and Vanco   - Follow up Urine culture.  - Adjust abx accordingly  - ID consulted for abx recs Admit to medicine, continue monitoring.  Hx of recurrent UTI   CTAP w/IV contrast showing Heterogeneous enhancement of the bilateral kidneys, suspicious for pyelonephritis.  UA showing large leukocyte esterase.  Per ED note' patient's last urine culture grew e-coli resistant to Levaquin ,bactrim and Cipro.  Patient s/p aztreonam and vancomycin in ED.  - Patient started on Meropenem and Vanco, vanc d/c'ed 5/26 AM due to elevated vanc level, no indication for vanc at this time per ID  - Follow up Urine culture.  - Adjust abx accordingly  - ID consulted f/u abx recs

## 2022-05-26 NOTE — SWALLOW BEDSIDE ASSESSMENT ADULT - ASR SWALLOW RECOMMEND DIAG
Cinesophagram is NOT warranted given Patient with No overt signs of impaired airway protection for all PO Trials with Rx for a Regular with Thin Liquid diet. Cinesophagram is NOT warranted given Patient with No overt signs of impaired airway protection for all PO Trials with Rx for a Regular with Thin Liquid diet. CXR clear lungs.

## 2022-05-26 NOTE — PROVIDER CONTACT NOTE (OTHER) - ACTION/TREATMENT ORDERED:
md made aware will decrease vanco
md made aware, give po tynenol
Continue to monitor
MD made aware, ordered IV tylenol for body aches and chills
Md made aware, will put in fluids and cont to monitor BP

## 2022-05-26 NOTE — PROGRESS NOTE ADULT - ATTENDING COMMENTS
63 yo F w/ hx prior UTI ( resistant Ecoli; unable to find Ucx hx of PCN allergy) p/w b/l flank pain and fever and CT A/P with concern for b/l pyelonephritis. UA+ UCx- E coli. Bcx- gram neg rods;  febrile 5/25 Tm 101.5 1800    Sepsis secondary to UTI- Ucx with Ecoli                                     - MRSA swab +postive Vanco level elevated; hold for now; ? colonization                                      - cont meropenem                                      - more awake alert than yesterday was up washing up today;                                        - repeat lactate 2.4 this AM BP on low side but asymptomatic will keep on IVF for now repeat lactate  DM- A1c-9.3; hold oral hypoglycemics; repeat S/S regular with thin; states ate a little this AM   Dizziness- check orthostatics; no compliant of dizziness   thrombocytopenia- likely in setting of sepsis; 86--111  hyponatremia- cont IVF; 135     Dispo: PT consult

## 2022-05-26 NOTE — PROGRESS NOTE ADULT - PROBLEM SELECTOR PLAN 2
Patient meets sepsis criteria due to fever and leukocytosis.  Sepsis 2/2 E Coli bacteremia and UTI  Blood cx 5/24 positive for E Ecoli   Vitals q4h.  s/p 3L IVF   Monitor closely.  On alysia ad Vanco     - Lactate improve to 2  - will give additional LR 1200mL and repeat lactate in AM  - follow blood cx sensitivities and urine cx  - ID consulted Patient meets sepsis criteria due to fever and leukocytosis.  Sepsis 2/2 E Coli bacteremia and UTI  Blood cx 5/24 positive for E Ecoli   Vitals q4h.  s/p 3L IVF   Monitor closely.  On alysia, held vancomycin on 5/26    - Lactate improve to 2  - will give additional LR 1200mL and repeat lactate in AM  - follow blood cx sensitivities and urine cx  - ID consulted

## 2022-05-26 NOTE — PROGRESS NOTE ADULT - PROBLEM SELECTOR PLAN 3
Patient initially with lactate of 2.2-->4.1.  Patient s/p 3L LR    - LR 1200 mL and trend VBG lactate

## 2022-05-26 NOTE — PROGRESS NOTE ADULT - PROBLEM SELECTOR PLAN 11
Lovenox 40 mg subcutaneous daily.    Diet: DASH/CC mechanical soft  Speech and swallow evaluation Lovenox 40 mg subcutaneous daily.    Diet: DASH/CC  Speech and swallow evaluation: regular diet, thin liquids

## 2022-05-26 NOTE — PROGRESS NOTE ADULT - SUBJECTIVE AND OBJECTIVE BOX
PROGRESS NOTE:   Authoted by Dr. Alyson Rob MD    Pager [phone] [facility], [address] [facility]     Patient is a 62y old  Female who presents with a chief complaint of Pyelonephritis (25 May 2022 16:14)      SUBJECTIVE / OVERNIGHT EVENTS:     REVIEW OF SYSTEMS:    CONSTITUTIONAL: No weakness, fevers or chills  EYES/ENT: No visual changes;  No vertigo or throat pain   NECK: No pain or stiffness  RESPIRATORY: No cough, wheezing, hemoptysis; No shortness of breath  CARDIOVASCULAR: No chest pain or palpitations  GASTROINTESTINAL: No abdominal or epigastric pain. No nausea, vomiting, or hematemesis; No diarrhea or constipation. No melena or hematochezia.  GENITOURINARY: No dysuria, frequency or hematuria  NEUROLOGICAL: No numbness or weakness  SKIN: No itching, rashes    MEDICATIONS  (STANDING):  dextrose 5%. 1000 milliLiter(s) (100 mL/Hr) IV Continuous <Continuous>  dextrose 5%. 1000 milliLiter(s) (50 mL/Hr) IV Continuous <Continuous>  dextrose 50% Injectable 25 Gram(s) IV Push once  dextrose 50% Injectable 12.5 Gram(s) IV Push once  dextrose 50% Injectable 25 Gram(s) IV Push once  enoxaparin Injectable 40 milliGRAM(s) SubCutaneous every 24 hours  glucagon  Injectable 1 milliGRAM(s) IntraMuscular once  insulin glargine Injectable (LANTUS) 10 Unit(s) SubCutaneous at bedtime  insulin lispro (ADMELOG) corrective regimen sliding scale   SubCutaneous three times a day before meals  insulin lispro (ADMELOG) corrective regimen sliding scale   SubCutaneous at bedtime  lactated ringers. 1000 milliLiter(s) (100 mL/Hr) IV Continuous <Continuous>  lactated ringers. 1000 milliLiter(s) (100 mL/Hr) IV Continuous <Continuous>  lactated ringers. 1000 milliLiter(s) (100 mL/Hr) IV Continuous <Continuous>  meropenem  IVPB 1000 milliGRAM(s) IV Intermittent every 8 hours  mupirocin 2% Ointment 1 Application(s) Both Nostrils two times a day  potassium chloride    Tablet ER 20 milliEquivalent(s) Oral once  sodium chloride 0.9% lock flush 3 milliLiter(s) IV Push every 8 hours    MEDICATIONS  (PRN):  acetaminophen     Tablet .. 650 milliGRAM(s) Oral every 6 hours PRN Temp greater or equal to 38C (100.4F), Mild Pain (1 - 3), Moderate Pain (4 - 6)  dextrose Oral Gel 15 Gram(s) Oral once PRN Blood Glucose LESS THAN 70 milliGRAM(s)/deciliter  ondansetron Injectable 4 milliGRAM(s) IV Push every 8 hours PRN Nausea and/or Vomiting  ondansetron Injectable 4 milliGRAM(s) IV Push every 8 hours PRN Nausea and/or Vomiting      CAPILLARY BLOOD GLUCOSE      POCT Blood Glucose.: 221 mg/dL (26 May 2022 12:01)  POCT Blood Glucose.: 201 mg/dL (26 May 2022 08:43)  POCT Blood Glucose.: 195 mg/dL (25 May 2022 22:50)  POCT Blood Glucose.: 250 mg/dL (25 May 2022 17:44)  POCT Blood Glucose.: 244 mg/dL (25 May 2022 12:37)    I&O's Summary      PHYSICAL EXAM:  Vital Signs Last 24 Hrs  T(C): 36.5 (26 May 2022 09:00), Max: 38.6 (25 May 2022 18:00)  T(F): 97.7 (26 May 2022 09:00), Max: 101.5 (25 May 2022 18:00)  HR: 65 (26 May 2022 09:00) (65 - 103)  BP: 99/48 (26 May 2022 09:00) (99/48 - 160/69)  BP(mean): --  RR: 18 (26 May 2022 09:00) (18 - 18)  SpO2: 100% (26 May 2022 09:00) (94% - 100%)    CONSTITUTIONAL: NAD, well-developed  RESPIRATORY: Normal respiratory effort; lungs are clear to auscultation bilaterally  CARDIOVASCULAR: Regular rate and rhythm, normal S1 and S2, no murmur/rub/gallop; No lower extremity edema; Peripheral pulses are 2+ bilaterally  ABDOMEN: Nontender to palpation, normoactive bowel sounds, no rebound/guarding; No hepatosplenomegaly  MUSCLOSKELETAL: no clubbing or cyanosis of digits; no joint swelling or tenderness to palpation  PSYCH: A+O to person, place, and time; affect appropriate  NEURO: Non-focal, no tremors  SKIN: No rashes    LABS:                        11.1   10.66 )-----------( 111      ( 26 May 2022 05:00 )             35.3     -    135  |  97<L>  |  10  ----------------------------<  157<H>  3.4<L>   |  27  |  0.81    Ca    9.1      26 May 2022 05:00  Phos  2.3     -  Mg     2.00         TPro  8.5<H>  /  Alb  3.1<L>  /  TBili  0.5  /  DBili  x   /  AST  22  /  ALT  17  /  AlkPhos  80            Urinalysis Basic - ( 24 May 2022 19:33 )    Color: Light Orange / Appearance: Turbid / S.031 / pH: x  Gluc: x / Ketone: Negative  / Bili: Negative / Urobili: <2 mg/dL   Blood: x / Protein: 30 mg/dL / Nitrite: Negative   Leuk Esterase: Large / RBC: 2 /HPF / WBC tntc /HPF   Sq Epi: x / Non Sq Epi: few /HPF / Bacteria: Moderate        Culture - Blood (collected 24 May 2022 21:45)  Source: .Blood Blood-Venous  Gram Stain (26 May 2022 02:16):    Growth in aerobic bottle: Gram Negative Rods    Growth in anaerobic bottle: Gram Negative Rods  Preliminary Report (26 May 2022 02:16):    Growth in aerobic bottle: Gram Negative Rods    Growth in anaerobic bottle: Gram Negative Rods    ***Blood Panel PCR results on this specimen are available    approximately 3 hours after the Gram stain result.***    Gram stain, PCR, and/or culture results may not always    correspond due to difference in methodologies.    ************************************************************    This PCR assay was performed by multiplex PCR. This    Assay tests for 66 bacterial and resistance gene targets.    Please refer to the [facility] test directory    at [email or web address] for details.  Organism: Blood Culture PCR (25 May 2022 13:31)  Organism: Blood Culture PCR (25 May 2022 13:31)    Culture - Blood (collected 24 May 2022 21:45)  Source: .Blood Blood-Peripheral  Gram Stain (26 May 2022 05:04):    Growth in aerobic bottle: Gram Negative Rods    Growth in anaerobic bottle: Gram Negative Rods  Preliminary Report (26 May 2022 05:04):    Growth in aerobic bottle: Gram Negative Rods    Growth in anaerobic bottle: Gram Negative Rods    Culture - Urine (collected 24 May 2022 20:00)  Source: Clean Catch Clean Catch (Midstream)  Preliminary Report (25 May 2022 21:45):    >100,000 CFU/ml Escherichia coli        RADIOLOGY & ADDITIONAL TESTS:  No new imaging or tests    COORDINATION OF CARE:  Care Discussed with Consultants/Other Providers [Y/N]:  Prior or Outpatient Records Reviewed [Y/N]:   PROGRESS NOTE:   Authoted by Dr. Alyson Rob MD    Pager [phone] [facility], [id number] [facility]     Patient is a 62y old  Female who presents with a chief complaint of Pyelonephritis (25 May 2022 16:14)    SUBJECTIVE / OVERNIGHT EVENTS: No significant overnight events. Pt endorses b/l LQ pain and suprapubic pain, and feeling of intermittent urgency to use the restroom, however she states that her symptoms are improving since admission. Pt denies chest pain, palpitations, SOB, dizziness/lightheadedhess, syncope, diarrhea/melena.     MEDICATIONS  (STANDING):  dextrose 5%. 1000 milliLiter(s) (100 mL/Hr) IV Continuous <Continuous>  dextrose 5%. 1000 milliLiter(s) (50 mL/Hr) IV Continuous <Continuous>  dextrose 50% Injectable 25 Gram(s) IV Push once  dextrose 50% Injectable 12.5 Gram(s) IV Push once  dextrose 50% Injectable 25 Gram(s) IV Push once  enoxaparin Injectable 40 milliGRAM(s) SubCutaneous every 24 hours  glucagon  Injectable 1 milliGRAM(s) IntraMuscular once  insulin glargine Injectable (LANTUS) 10 Unit(s) SubCutaneous at bedtime  insulin lispro (ADMELOG) corrective regimen sliding scale   SubCutaneous three times a day before meals  insulin lispro (ADMELOG) corrective regimen sliding scale   SubCutaneous at bedtime  lactated ringers. 1000 milliLiter(s) (100 mL/Hr) IV Continuous <Continuous>  lactated ringers. 1000 milliLiter(s) (100 mL/Hr) IV Continuous <Continuous>  lactated ringers. 1000 milliLiter(s) (100 mL/Hr) IV Continuous <Continuous>  meropenem  IVPB 1000 milliGRAM(s) IV Intermittent every 8 hours  mupirocin 2% Ointment 1 Application(s) Both Nostrils two times a day  potassium chloride    Tablet ER 20 milliEquivalent(s) Oral once  sodium chloride 0.9% lock flush 3 milliLiter(s) IV Push every 8 hours    MEDICATIONS  (PRN):  acetaminophen     Tablet .. 650 milliGRAM(s) Oral every 6 hours PRN Temp greater or equal to 38C (100.4F), Mild Pain (1 - 3), Moderate Pain (4 - 6)  dextrose Oral Gel 15 Gram(s) Oral once PRN Blood Glucose LESS THAN 70 milliGRAM(s)/deciliter  ondansetron Injectable 4 milliGRAM(s) IV Push every 8 hours PRN Nausea and/or Vomiting  ondansetron Injectable 4 milliGRAM(s) IV Push every 8 hours PRN Nausea and/or Vomiting    CAPILLARY BLOOD GLUCOSE  POCT Blood Glucose.: 221 mg/dL (26 May 2022 12:01)  POCT Blood Glucose.: 201 mg/dL (26 May 2022 08:43)  POCT Blood Glucose.: 195 mg/dL (25 May 2022 22:50)  POCT Blood Glucose.: 250 mg/dL (25 May 2022 17:44)  POCT Blood Glucose.: 244 mg/dL (25 May 2022 12:37)    I&O's Summary      PHYSICAL EXAM:  Vital Signs Last 24 Hrs  T(C): 36.5 (26 May 2022 09:00), Max: 38.6 (25 May 2022 18:00)  T(F): 97.7 (26 May 2022 09:00), Max: 101.5 (25 May 2022 18:00)  HR: 65 (26 May 2022 09:00) (65 - 103)  BP: 99/48 (26 May 2022 09:00) (99/48 - 160/69)  RR: 18 (26 May 2022 09:00) (18 - 18)  SpO2: 100% (26 May 2022 09:00) (94% - 100%)    PHYSICAL EXAM:  GENERAL: somnolent, weak, AAOX3   HEAD:  Atraumatic, Normocephalic  EYES: EOMI, PERRL, conjunctiva and sclera clear  CHEST/LUNG: Clear to auscultation bilaterally; No wheeze  HEART: Regular rate and rhythm; No murmurs, rubs, or gallops  ABDOMEN: CV tenderness and suprapubic tenderness   EXTREMITIES:  2+ Peripheral Pulses, No clubbing, cyanosis, or edema  PSYCH: AAOx3  NEUROLOGY: non-focal  SKIN: No rashes or lesions       LABS:                        11.1   10.66 )-----------( 111      ( 26 May 2022 05:00 )             35.3     05-26    135  |  97<L>  |  10  ----------------------------<  157<H>  3.4<L>   |  27  |  0.81    Ca    9.1      26 May 2022 05:00  Phos  2.3     -  Mg     2.00         TPro  8.5<H>  /  Alb  3.1<L>  /  TBili  0.5  /  DBili  x   /  AST  22  /  ALT  17  /  AlkPhos  80      Urinalysis Basic - ( 24 May 2022 19:33 )    Color: Light Orange / Appearance: Turbid / S.031 / pH: x  Gluc: x / Ketone: Negative  / Bili: Negative / Urobili: <2 mg/dL   Blood: x / Protein: 30 mg/dL / Nitrite: Negative   Leuk Esterase: Large / RBC: 2 /HPF / WBC tntc /HPF   Sq Epi: x / Non Sq Epi: few /HPF / Bacteria: Moderate        Culture - Blood (collected 24 May 2022 21:45)  Source: .Blood Blood-Venous  Gram Stain (26 May 2022 02:16):    Growth in aerobic bottle: Gram Negative Rods    Growth in anaerobic bottle: Gram Negative Rods  Preliminary Report (26 May 2022 02:16):    Growth in aerobic bottle: Gram Negative Rods    Growth in anaerobic bottle: Gram Negative Rods    ***Blood Panel PCR results on this specimen are available    approximately 3 hours after the Gram stain result.***    Gram stain, PCR, and/or culture results may not always    correspond due to difference in methodologies.    ************************************************************    This PCR assay was performed by multiplex PCR. This    Assay tests for 66 bacterial and resistance gene targets.    Please refer to the [facility] test directory    at [email or web address] for details.  Organism: Blood Culture PCR (25 May 2022 13:31)  Organism: Blood Culture PCR (25 May 2022 13:31)    Culture - Blood (collected 24 May 2022 21:45)  Source: .Blood Blood-Peripheral  Gram Stain (26 May 2022 05:04):    Growth in aerobic bottle: Gram Negative Rods    Growth in anaerobic bottle: Gram Negative Rods  Preliminary Report (26 May 2022 05:04):    Growth in aerobic bottle: Gram Negative Rods    Growth in anaerobic bottle: Gram Negative Rods    Culture - Urine (collected 24 May 2022 20:00)  Source: Clean Catch Clean Catch (Midstream)  Preliminary Report (25 May 2022 21:45):    >100,000 CFU/ml Escherichia coli    RADIOLOGY & ADDITIONAL TESTS:  No new imaging or tests    COORDINATION OF CARE:  Care Discussed with Consultants/Other Providers [Y/N]:  Prior or Outpatient Records Reviewed [Y/N]:

## 2022-05-26 NOTE — PROGRESS NOTE ADULT - PROBLEM SELECTOR PLAN 6
Patient reports being on metformin 1000 mg BID. Patient does not remember other diabetic medications.  Med rec pharmacist emailed.  Will hold metformin while inpatient.  Low dose insulin sliding scale ordered.  Consistent carbohydrate diet ordered.  HgbA1c 9.3    - endocrine consult

## 2022-05-26 NOTE — PROGRESS NOTE ADULT - PROBLEM SELECTOR PLAN 4
Patient reporting dizziness since march.  Possibly in setting of poor PO intake.  Orthostatics ordered.  Patient on LR at 100 ml/hr for 10 hours.  If no improvement consider Echo.    - patient refused orthostatics 5/25 Patient reporting dizziness since march.  Possibly in setting of poor PO intake.  Orthostatics were negative on 5/26  Patient on LR at 100 ml/hr for 24 hours  If no improvement consider outpatient workup with TTE

## 2022-05-26 NOTE — SWALLOW BEDSIDE ASSESSMENT ADULT - ASR SWALLOW REFERRAL
as patient will benefit oral supplements to maximize caloric needs given recent weight loss in the past couple of months./Registered Dietitian

## 2022-05-26 NOTE — PROGRESS NOTE ADULT - PROBLEM SELECTOR PLAN 5
Patient with platelet count of 120. Platelet count 137 in February 2017.  Will order platelet blue top count- true thrombocytopenia   Continue trending.    - likely in setting of sepsis

## 2022-05-26 NOTE — PROVIDER CONTACT NOTE (OTHER) - REASON
Bp low of 99/48
Pt BP:116/43, HR:121
Pt afebrile but complaining of body aches and chills
temp of 100.3
vanco troph of 56.5

## 2022-05-26 NOTE — PROVIDER CONTACT NOTE (CRITICAL VALUE NOTIFICATION) - SITUATION
Blood cultures 5/24: gram negative rods in aerobic and anaerobic bottles, pt admitted for Pyelonephritis

## 2022-05-26 NOTE — SWALLOW BEDSIDE ASSESSMENT ADULT - SWALLOW EVAL: DIAGNOSIS
Patient presents with Functional Oral and Pharyngeal Stage swallowing mechanism characterized by adequate oral containment, adequate chewing for solid, adequate bolus manipulation and transfer with adequate oral clearance. There is laryngeal elevation upon palpation, initiation of the pharyngeal swallow. There were overt signs of impaired airway protection. Patient presents with Functional Oral and Pharyngeal Stage swallowing mechanism characterized by adequate oral containment, adequate chewing for solid, adequate bolus manipulation and transfer with adequate oral clearance. There is laryngeal elevation upon palpation, initiation of the pharyngeal swallow. There were NO overt signs of impaired airway protection.

## 2022-05-26 NOTE — PHYSICAL THERAPY INITIAL EVALUATION ADULT - PERTINENT HX OF CURRENT PROBLEM, REHAB EVAL
Patient is 62 year old M with PMH of Type 2 Diabetes on Metformin, Hypertension on Ramipril presents to the ED complaining of bilateral flank and lower abdominal pain.

## 2022-05-26 NOTE — PROVIDER CONTACT NOTE (OTHER) - RECOMMENDATIONS
give IV tylenol, pt does not want PO tylenol, reports that she cannot even keep water down
md made aware will decrease vanco
md made aware, give po tynenol
Continue to monitor
Md made aware, will put in fluids and cont to monitor BP

## 2022-05-27 LAB
-  AMIKACIN: SIGNIFICANT CHANGE UP
-  AMIKACIN: SIGNIFICANT CHANGE UP
-  AMOXICILLIN/CLAVULANIC ACID: SIGNIFICANT CHANGE UP
-  AMPICILLIN/SULBACTAM: SIGNIFICANT CHANGE UP
-  AMPICILLIN/SULBACTAM: SIGNIFICANT CHANGE UP
-  AMPICILLIN: SIGNIFICANT CHANGE UP
-  AMPICILLIN: SIGNIFICANT CHANGE UP
-  AZTREONAM: SIGNIFICANT CHANGE UP
-  AZTREONAM: SIGNIFICANT CHANGE UP
-  CEFAZOLIN: SIGNIFICANT CHANGE UP
-  CEFAZOLIN: SIGNIFICANT CHANGE UP
-  CEFEPIME: SIGNIFICANT CHANGE UP
-  CEFEPIME: SIGNIFICANT CHANGE UP
-  CEFOXITIN: SIGNIFICANT CHANGE UP
-  CEFOXITIN: SIGNIFICANT CHANGE UP
-  CEFTRIAXONE: SIGNIFICANT CHANGE UP
-  CEFTRIAXONE: SIGNIFICANT CHANGE UP
-  CIPROFLOXACIN: SIGNIFICANT CHANGE UP
-  CIPROFLOXACIN: SIGNIFICANT CHANGE UP
-  ERTAPENEM: SIGNIFICANT CHANGE UP
-  ERTAPENEM: SIGNIFICANT CHANGE UP
-  GENTAMICIN: SIGNIFICANT CHANGE UP
-  GENTAMICIN: SIGNIFICANT CHANGE UP
-  IMIPENEM: SIGNIFICANT CHANGE UP
-  IMIPENEM: SIGNIFICANT CHANGE UP
-  LEVOFLOXACIN: SIGNIFICANT CHANGE UP
-  LEVOFLOXACIN: SIGNIFICANT CHANGE UP
-  MEROPENEM: SIGNIFICANT CHANGE UP
-  MEROPENEM: SIGNIFICANT CHANGE UP
-  NITROFURANTOIN: SIGNIFICANT CHANGE UP
-  PIPERACILLIN/TAZOBACTAM: SIGNIFICANT CHANGE UP
-  PIPERACILLIN/TAZOBACTAM: SIGNIFICANT CHANGE UP
-  TIGECYCLINE: SIGNIFICANT CHANGE UP
-  TOBRAMYCIN: SIGNIFICANT CHANGE UP
-  TOBRAMYCIN: SIGNIFICANT CHANGE UP
-  TRIMETHOPRIM/SULFAMETHOXAZOLE: SIGNIFICANT CHANGE UP
-  TRIMETHOPRIM/SULFAMETHOXAZOLE: SIGNIFICANT CHANGE UP
ALBUMIN SERPL ELPH-MCNC: 2.4 G/DL — LOW (ref 3.3–5)
ALP SERPL-CCNC: 74 U/L — SIGNIFICANT CHANGE UP (ref 40–120)
ALT FLD-CCNC: 16 U/L — SIGNIFICANT CHANGE UP (ref 4–33)
ANION GAP SERPL CALC-SCNC: 6 MMOL/L — LOW (ref 7–14)
AST SERPL-CCNC: 21 U/L — SIGNIFICANT CHANGE UP (ref 4–32)
BASE EXCESS BLDV CALC-SCNC: 4.5 MMOL/L — HIGH (ref -2–3)
BILIRUB SERPL-MCNC: 0.3 MG/DL — SIGNIFICANT CHANGE UP (ref 0.2–1.2)
BLOOD GAS VENOUS COMPREHENSIVE RESULT: SIGNIFICANT CHANGE UP
BUN SERPL-MCNC: 9 MG/DL — SIGNIFICANT CHANGE UP (ref 7–23)
CALCIUM SERPL-MCNC: 8.3 MG/DL — LOW (ref 8.4–10.5)
CHLORIDE BLDV-SCNC: 104 MMOL/L — SIGNIFICANT CHANGE UP (ref 96–108)
CHLORIDE SERPL-SCNC: 105 MMOL/L — SIGNIFICANT CHANGE UP (ref 98–107)
CO2 BLDV-SCNC: 31.2 MMOL/L — HIGH (ref 22–26)
CO2 SERPL-SCNC: 26 MMOL/L — SIGNIFICANT CHANGE UP (ref 22–31)
CREAT SERPL-MCNC: 0.69 MG/DL — SIGNIFICANT CHANGE UP (ref 0.5–1.3)
CULTURE RESULTS: SIGNIFICANT CHANGE UP
EGFR: 99 ML/MIN/1.73M2 — SIGNIFICANT CHANGE UP
GAS PNL BLDV: 136 MMOL/L — SIGNIFICANT CHANGE UP (ref 136–145)
GLUCOSE BLDV-MCNC: 136 MG/DL — HIGH (ref 70–99)
GLUCOSE SERPL-MCNC: 139 MG/DL — HIGH (ref 70–99)
HCO3 BLDV-SCNC: 30 MMOL/L — HIGH (ref 22–29)
HCT VFR BLD CALC: 29.2 % — LOW (ref 34.5–45)
HCT VFR BLDA CALC: 30 % — LOW (ref 34.5–46.5)
HGB BLD CALC-MCNC: 9.9 G/DL — LOW (ref 11.5–15.5)
HGB BLD-MCNC: 9.4 G/DL — LOW (ref 11.5–15.5)
LACTATE BLDV-MCNC: 1.5 MMOL/L — SIGNIFICANT CHANGE UP (ref 0.5–2)
MAGNESIUM SERPL-MCNC: 1.9 MG/DL — SIGNIFICANT CHANGE UP (ref 1.6–2.6)
MCHC RBC-ENTMCNC: 27.2 PG — SIGNIFICANT CHANGE UP (ref 27–34)
MCHC RBC-ENTMCNC: 32.2 GM/DL — SIGNIFICANT CHANGE UP (ref 32–36)
MCV RBC AUTO: 84.4 FL — SIGNIFICANT CHANGE UP (ref 80–100)
METHOD TYPE: SIGNIFICANT CHANGE UP
METHOD TYPE: SIGNIFICANT CHANGE UP
NRBC # BLD: 0 /100 WBCS — SIGNIFICANT CHANGE UP
NRBC # FLD: 0 K/UL — SIGNIFICANT CHANGE UP
ORGANISM # SPEC MICROSCOPIC CNT: SIGNIFICANT CHANGE UP
PCO2 BLDV: 47 MMHG — HIGH (ref 39–42)
PH BLDV: 7.41 — SIGNIFICANT CHANGE UP (ref 7.32–7.43)
PHOSPHATE SERPL-MCNC: 2.2 MG/DL — LOW (ref 2.5–4.5)
PLATELET # BLD AUTO: 109 K/UL — LOW (ref 150–400)
PO2 BLDV: 50 MMHG — SIGNIFICANT CHANGE UP
POTASSIUM BLDV-SCNC: 3.8 MMOL/L — SIGNIFICANT CHANGE UP (ref 3.5–5.1)
POTASSIUM SERPL-MCNC: 3.8 MMOL/L — SIGNIFICANT CHANGE UP (ref 3.5–5.3)
POTASSIUM SERPL-SCNC: 3.8 MMOL/L — SIGNIFICANT CHANGE UP (ref 3.5–5.3)
PROT SERPL-MCNC: 6.5 G/DL — SIGNIFICANT CHANGE UP (ref 6–8.3)
RBC # BLD: 3.46 M/UL — LOW (ref 3.8–5.2)
RBC # FLD: 14.8 % — HIGH (ref 10.3–14.5)
SAO2 % BLDV: 83.9 % — SIGNIFICANT CHANGE UP
SODIUM SERPL-SCNC: 137 MMOL/L — SIGNIFICANT CHANGE UP (ref 135–145)
SPECIMEN SOURCE: SIGNIFICANT CHANGE UP
WBC # BLD: 5.65 K/UL — SIGNIFICANT CHANGE UP (ref 3.8–10.5)
WBC # FLD AUTO: 5.65 K/UL — SIGNIFICANT CHANGE UP (ref 3.8–10.5)

## 2022-05-27 PROCEDURE — 99233 SBSQ HOSP IP/OBS HIGH 50: CPT | Mod: GC

## 2022-05-27 PROCEDURE — 99232 SBSQ HOSP IP/OBS MODERATE 35: CPT

## 2022-05-27 RX ORDER — INSULIN LISPRO 100/ML
5 VIAL (ML) SUBCUTANEOUS
Refills: 0 | Status: DISCONTINUED | OUTPATIENT
Start: 2022-05-27 | End: 2022-05-27

## 2022-05-27 RX ORDER — CALCIUM ACETATE 667 MG
667 TABLET ORAL
Refills: 0 | Status: COMPLETED | OUTPATIENT
Start: 2022-05-27 | End: 2022-05-28

## 2022-05-27 RX ORDER — INSULIN LISPRO 100/ML
3 VIAL (ML) SUBCUTANEOUS
Refills: 0 | Status: DISCONTINUED | OUTPATIENT
Start: 2022-05-27 | End: 2022-05-31

## 2022-05-27 RX ORDER — CEFTRIAXONE 500 MG/1
2000 INJECTION, POWDER, FOR SOLUTION INTRAMUSCULAR; INTRAVENOUS EVERY 24 HOURS
Refills: 0 | Status: DISCONTINUED | OUTPATIENT
Start: 2022-05-27 | End: 2022-05-31

## 2022-05-27 RX ORDER — LACTOBACILLUS ACIDOPHILUS 100MM CELL
1 CAPSULE ORAL DAILY
Refills: 0 | Status: DISCONTINUED | OUTPATIENT
Start: 2022-05-27 | End: 2022-05-31

## 2022-05-27 RX ORDER — MECLIZINE HCL 12.5 MG
12.5 TABLET ORAL ONCE
Refills: 0 | Status: COMPLETED | OUTPATIENT
Start: 2022-05-27 | End: 2022-05-27

## 2022-05-27 RX ADMIN — Medication 1 TABLET(S): at 17:23

## 2022-05-27 RX ADMIN — MEROPENEM 100 MILLIGRAM(S): 1 INJECTION INTRAVENOUS at 13:20

## 2022-05-27 RX ADMIN — Medication 650 MILLIGRAM(S): at 00:55

## 2022-05-27 RX ADMIN — SODIUM CHLORIDE 3 MILLILITER(S): 9 INJECTION INTRAMUSCULAR; INTRAVENOUS; SUBCUTANEOUS at 22:18

## 2022-05-27 RX ADMIN — Medication 1: at 18:00

## 2022-05-27 RX ADMIN — SODIUM CHLORIDE 3 MILLILITER(S): 9 INJECTION INTRAMUSCULAR; INTRAVENOUS; SUBCUTANEOUS at 13:59

## 2022-05-27 RX ADMIN — Medication 3: at 12:36

## 2022-05-27 RX ADMIN — Medication 667 MILLIGRAM(S): at 12:37

## 2022-05-27 RX ADMIN — Medication 650 MILLIGRAM(S): at 18:23

## 2022-05-27 RX ADMIN — INSULIN GLARGINE 12 UNIT(S): 100 INJECTION, SOLUTION SUBCUTANEOUS at 22:24

## 2022-05-27 RX ADMIN — Medication 650 MILLIGRAM(S): at 17:23

## 2022-05-27 RX ADMIN — MEROPENEM 100 MILLIGRAM(S): 1 INJECTION INTRAVENOUS at 06:08

## 2022-05-27 RX ADMIN — Medication 1: at 22:25

## 2022-05-27 RX ADMIN — Medication 12.5 MILLIGRAM(S): at 17:22

## 2022-05-27 RX ADMIN — Medication 650 MILLIGRAM(S): at 11:03

## 2022-05-27 RX ADMIN — CEFTRIAXONE 100 MILLIGRAM(S): 500 INJECTION, POWDER, FOR SOLUTION INTRAMUSCULAR; INTRAVENOUS at 17:24

## 2022-05-27 RX ADMIN — SODIUM CHLORIDE 3 MILLILITER(S): 9 INJECTION INTRAMUSCULAR; INTRAVENOUS; SUBCUTANEOUS at 05:50

## 2022-05-27 RX ADMIN — Medication 667 MILLIGRAM(S): at 17:23

## 2022-05-27 RX ADMIN — Medication 650 MILLIGRAM(S): at 01:40

## 2022-05-27 RX ADMIN — Medication 650 MILLIGRAM(S): at 10:03

## 2022-05-27 NOTE — PROGRESS NOTE ADULT - ATTENDING COMMENTS
63 yo F w/ hx prior UTI ( resistant Ecoli; unable to find Ucx hx of PCN allergy) p/w b/l flank pain and fever and CT A/P with concern for b/l pyelonephritis. UA+ UCx- E coli. Bcx- gram neg rods; awake alert low grade fever 100.3; repeat lactate improved    Sepsis secondary to UTI- Ucx with Ecoli                                     - MRSA swab +positive Vanco level elevated; hold for now; ? colonization                                      - cont meropenem                                      - repeat BCx NGTD 5/26                                      - lactate 2.4 ---now normal   DM- A1c-9.3; hold oral hypoglycemics; repeat S/S regular with thin; FS above goal; insulin regimen as per endo  Dizziness- orthostatic neg; still with compliant of dizziness. Poss due to not eating breakfast this AM states has history of migraine try tylenol; if dizziness persistent despite resolution of headache; trial of meclizine.   thrombocytopenia- likely in setting of sepsis; 86--111--109  hyponatremia- resolved monitor off IVF      Dispo: PT rehab 63 yo F w/ hx prior UTI ( resistant Ecoli; unable to find Ucx hx of PCN allergy) p/w b/l flank pain and fever and CT A/P with concern for b/l pyelonephritis. UA+ UCx- E coli. Bcx- gram neg rods; awake alert low grade fever 100.3; repeat lactate improved. notes some suprapubic tenderness  Severe Sepsis w/o septic shock secondary to pyelonephritis- Ucx with Ecoli                                     - MRSA swab +positive Vanco level elevated; hold for now; ? colonization                                      - cont meropenem                                      - repeat BCx NGTD 5/26                                      - lactate 2.4 ---now normal   DM- A1c-9.3; hold oral hypoglycemics; repeat S/S regular with thin; FS above goal; insulin regimen as per endo  Dizziness- orthostatic neg; still with compliant of dizziness. Poss due to not eating breakfast this AM states has history of migraine try tylenol; if dizziness persistent despite resolution of headache; trial of meclizine.   thrombocytopenia- likely in setting of sepsis; 86--111--109  hyponatremia- resolved monitor off IVF      Dispo: PT rehab 61 yo F w/ hx prior UTI ( resistant Ecoli; unable to find Ucx hx of PCN allergy) p/w b/l flank pain and fever and CT A/P with concern for b/l pyelonephritis. UA+ UCx- E coli. Bcx- Ecoli ; awake alert low grade fever 100.3; repeat lactate improved. notes some suprapubic tenderness with dizziness this AM but states improving since admission.   Severe Sepsis w/o septic shock secondary to pyelonephritis- Ucx with Ecoli                                      - MRSA swab +positive Vanco level elevated; hold for now; ? colonization                                      - cont meropenem                                      - repeat BCx NGTD 5/26                                       - lactate 2.4 ---now normal                                       - check PVR  DM- A1c-9.3; hold oral hypoglycemics; repeat S/S regular with thin; FS above goal; insulin regimen as per endo  Dizziness- orthostatic neg; still with compliant of dizziness. Poss due to not eating breakfast this AM; states has history of migraine try tylenol; if dizziness persistent despite resolution of headache; trial of meclizine.   thrombocytopenia- likely in setting of sepsis; 86--111--109  hyponatremia- resolved monitor off IVF      Dispo: PT rehab

## 2022-05-27 NOTE — PROGRESS NOTE ADULT - PROBLEM SELECTOR PLAN 3
Patient initially with lactate of 2.2-->4.1.  Patient s/p 3L LR    - LR 1200 mL and trend VBG lactate Patient reporting dizziness since march.  Possibly in setting of poor PO intake.  Orthostatics were negative on 5/26  Patient s/p LR at 100 ml/hr for 24 hours  If no improvement consider outpatient workup with TTE Patient reporting dizziness since march.  Possibly in setting of poor PO intake.  Orthostatics were negative on 5/26  Patient s/p LR at 100 ml/hr for 24 hours  will trial meclizine

## 2022-05-27 NOTE — PROGRESS NOTE ADULT - SUBJECTIVE AND OBJECTIVE BOX
Follow Up:  E coli sepsis, pyelo    Interval History/ROS:  feels better  still weak  no fever, chills    states allergy to penicillin nausea    Allergies  codeine (Nausea)  penicillin (Nausea)        ANTIMICROBIALS:  meropenem  IVPB 1000 every 8 hours      OTHER MEDS:  acetaminophen     Tablet .. 650 milliGRAM(s) Oral every 6 hours PRN  calcium acetate 667 milliGRAM(s) Oral three times a day with meals  dextrose 5%. 1000 milliLiter(s) IV Continuous <Continuous>  dextrose 5%. 1000 milliLiter(s) IV Continuous <Continuous>  dextrose 50% Injectable 25 Gram(s) IV Push once  dextrose 50% Injectable 12.5 Gram(s) IV Push once  dextrose 50% Injectable 25 Gram(s) IV Push once  dextrose Oral Gel 15 Gram(s) Oral once PRN  enoxaparin Injectable 40 milliGRAM(s) SubCutaneous every 24 hours  glucagon  Injectable 1 milliGRAM(s) IntraMuscular once  insulin glargine Injectable (LANTUS) 12 Unit(s) SubCutaneous at bedtime  insulin lispro (ADMELOG) corrective regimen sliding scale   SubCutaneous three times a day before meals  insulin lispro (ADMELOG) corrective regimen sliding scale   SubCutaneous at bedtime  lactated ringers. 1000 milliLiter(s) IV Continuous <Continuous>  lactated ringers. 1000 milliLiter(s) IV Continuous <Continuous>  mupirocin 2% Ointment 1 Application(s) Both Nostrils two times a day  ondansetron Injectable 4 milliGRAM(s) IV Push every 8 hours PRN  ondansetron Injectable 4 milliGRAM(s) IV Push every 8 hours PRN  sodium chloride 0.9% lock flush 3 milliLiter(s) IV Push every 8 hours      Vital Signs Last 24 Hrs  T(C): 37.5 (27 May 2022 10:00), Max: 37.9 (26 May 2022 17:55)  T(F): 99.5 (27 May 2022 10:00), Max: 100.3 (26 May 2022 17:55)  HR: 74 (27 May 2022 10:00) (72 - 99)  BP: 142/60 (27 May 2022 10:00) (112/50 - 142/60)  BP(mean): --  RR: 17 (27 May 2022 10:00) (16 - 18)  SpO2: 100% (27 May 2022 10:00) (100% - 100%)    PHYSICAL EXAM:  Constitutional: Not in acute distress  Eyes: No icterus.  Oral cavity: Clear, no lesions  Neck: Supple  RS: Chest clear to auscultation bilaterally. No wheeze/rhonchi/crepitations.  CVS: S1, S2 heard. Regular rate and rhythm. No murmurs/rubs/gallops.  Abdomen: Soft. No guarding/rigidity/tenderness.  : decreased CVA tenderness  Extremities: Warm. No pedal edema  Skin: No lesions noted  Vascular: No evidence of phlebitis  Neuro: Alert, oriented to time/place/person  Cranial nerves 2-12 grossly normal. No focal abnormalities                          9.4    5.65  )-----------( 109      ( 27 May 2022 05:30 )             29.2       05-27    137  |  105  |  9   ----------------------------<  139<H>  3.8   |  26  |  0.69    Ca    8.3<L>      27 May 2022 05:30  Phos  2.2     05-27  Mg     1.90     05-27    TPro  6.5  /  Alb  2.4<L>  /  TBili  0.3  /  DBili  x   /  AST  21  /  ALT  16  /  AlkPhos  74  05-27          MICROBIOLOGY:  v  .Blood Blood-Peripheral  05-24-22   Growth in aerobic bottle: Escherichia coli  See previous culture [id number]  Growth in anaerobic bottle: Gram Negative Rods  --  Blood Culture PCR  Escherichia coli      Clean Catch Clean Catch (Midstream)  05-24-22   >100,000 CFU/ml Escherichia coli  --  --          Rapid RVP Result: NotDetec (05-24 @ 16:05)        RADIOLOGY:

## 2022-05-27 NOTE — DIETITIAN INITIAL EVALUATION ADULT - PROBLEM/PLAN-6
DISPLAY PLAN FREE TEXT Unna Boot Text: An Unna boot was placed to help immobilize the limb and facilitate more rapid healing.

## 2022-05-27 NOTE — PROGRESS NOTE ADULT - SUBJECTIVE AND OBJECTIVE BOX
Chief Complaint: T2DM    History: No acute events. Patient has fair appetite.     MEDICATIONS  (STANDING):  calcium acetate 667 milliGRAM(s) Oral three times a day with meals  cefTRIAXone   IVPB 2000 milliGRAM(s) IV Intermittent every 24 hours  dextrose 5%. 1000 milliLiter(s) (50 mL/Hr) IV Continuous <Continuous>  dextrose 5%. 1000 milliLiter(s) (100 mL/Hr) IV Continuous <Continuous>  dextrose 50% Injectable 25 Gram(s) IV Push once  dextrose 50% Injectable 12.5 Gram(s) IV Push once  dextrose 50% Injectable 25 Gram(s) IV Push once  enoxaparin Injectable 40 milliGRAM(s) SubCutaneous every 24 hours  glucagon  Injectable 1 milliGRAM(s) IntraMuscular once  insulin glargine Injectable (LANTUS) 12 Unit(s) SubCutaneous at bedtime  insulin lispro (ADMELOG) corrective regimen sliding scale   SubCutaneous three times a day before meals  insulin lispro (ADMELOG) corrective regimen sliding scale   SubCutaneous at bedtime  lactobacillus acidophilus 1 Tablet(s) Oral daily  mupirocin 2% Ointment 1 Application(s) Both Nostrils two times a day  sodium chloride 0.9% lock flush 3 milliLiter(s) IV Push every 8 hours    MEDICATIONS  (PRN):  acetaminophen     Tablet .. 650 milliGRAM(s) Oral every 6 hours PRN Temp greater or equal to 38C (100.4F), Mild Pain (1 - 3), Moderate Pain (4 - 6)  dextrose Oral Gel 15 Gram(s) Oral once PRN Blood Glucose LESS THAN 70 milliGRAM(s)/deciliter  meclizine 12.5 milliGRAM(s) Oral once PRN Dizziness  ondansetron Injectable 4 milliGRAM(s) IV Push every 8 hours PRN Nausea and/or Vomiting      Allergies    codeine (Nausea)  penicillin (Nausea)    Intolerances      Review of Systems:  Constitutional: No fever  Eyes: No blurry vision  Neuro: No tremors  HEENT: No pain  Cardiovascular: No chest pain, palpitations  Respiratory: No SOB, no cough  GI: No nausea, vomiting, abdominal pain  : No dysuria  Skin: no rash  Psych: no depression  Endocrine: no polyuria, polydipsia      ALL OTHER SYSTEMS REVIEWED AND NEGATIVE      PHYSICAL EXAM:  VITALS: T(C): 36.7 (05-27-22 @ 15:16)  T(F): 98 (05-27-22 @ 15:16), Max: 100.3 (05-26-22 @ 17:55)  HR: 69 (05-27-22 @ 15:16) (69 - 99)  BP: 121/64 (05-27-22 @ 15:16) (112/50 - 142/60)  RR:  (16 - 18)  SpO2:  (100% - 100%)  Wt(kg): --  GENERAL: NAD, well-groomed, well-developed  EYES: No proptosis, no lid lag, anicteric  HEENT:  Atraumatic, Normocephalic, moist mucous membranes  RESPIRATORY: Clear to auscultation bilaterally  CARDIOVASCULAR: Regular rate and rhythm  GI: Soft, nontender, non distended, normal bowel sounds  SKIN: Dry, intact, No rashes or lesions  PSYCH: Alert and oriented x 3, normal affect, normal mood      POCT Blood Glucose.: 263 mg/dL (05-27-22 @ 12:34)  POCT Blood Glucose.: 134 mg/dL (05-27-22 @ 09:06)  POCT Blood Glucose.: 284 mg/dL (05-26-22 @ 22:16)  POCT Blood Glucose.: 247 mg/dL (05-26-22 @ 18:14)  POCT Blood Glucose.: 221 mg/dL (05-26-22 @ 12:01)  POCT Blood Glucose.: 201 mg/dL (05-26-22 @ 08:43)  POCT Blood Glucose.: 195 mg/dL (05-25-22 @ 22:50)  POCT Blood Glucose.: 250 mg/dL (05-25-22 @ 17:44)  POCT Blood Glucose.: 244 mg/dL (05-25-22 @ 12:37)  POCT Blood Glucose.: 246 mg/dL (05-25-22 @ 08:46)  POCT Blood Glucose.: 287 mg/dL (05-25-22 @ 00:12)      05-27    137  |  105  |  9   ----------------------------<  139<H>  3.8   |  26  |  0.69    eGFR: 99    Ca    8.3<L>      05-27  Mg     1.90     05-27  Phos  2.2     05-27    TPro  6.5  /  Alb  2.4<L>  /  TBili  0.3  /  DBili  x   /  AST  21  /  ALT  16  /  AlkPhos  74  05-27          Thyroid Function Tests:  05-25 @ 06:15 TSH 1.14 FreeT4 -- T3 -- Anti TPO -- Anti Thyroglobulin Ab -- TSI --

## 2022-05-27 NOTE — PROGRESS NOTE ADULT - ASSESSMENT
A/P: 62F history of uncontrolled type 2 diabetes (LhP8k=2.3%) a/w sepsis secondary to UTI/pyelonephritis, on IV antibiotics.    #type 2 diabetes  -goal FS range 100-180  -infection with pyelonephritis likely contributing to elevated FS  -c/s Lantus 12 Units HS and start Admelog 3 Units TIDAC  -continue LOW pre-meal and bedtime supplemental insulin  -continue to hold all oral diabetes medications    d/c planning:  -patient said at home she was taking Actos 30qd and metformin 1000mg bid. Said she had been taking off glimepiride because her A1c was in mid 6's in Jan before she started having UTI. She was also started on Jardiance in fall of 2021 which changed the smell of her urine and is how she feels she developed the UTIs-she is now off Jardiance. Given hx of UTIs and pyelonephritis, would avoid future use of SGLT2 inhibitors and discussed with patient.   -Recommend discontinuation of Actos on discharge due to increased risk of fracture associated with this medication (patient is a post menopausal woman)  -Would consider discharge on metformin plus GLP-1 agonist (please check if Trulicity or Ozempic are covered by insurance)    #thyroid  -TSH in normal range    Maritza Crawford DO

## 2022-05-27 NOTE — DIETITIAN INITIAL EVALUATION ADULT - OTHER INFO
Per chart, pt is 62 year old female PMH type 2 DM, HTN presenting with flank/abdominal pain admitted for pyelonephritis.     Pt confirms NKFA, denies difficulties chewing/swallowing. Pt lives at home alone, denies difficulties with access to food PTA. Pt with history of type 2 DM, HbA1c (5/25) 9.3%, managed by PCP. Pt reports taking Actos 30mg qD and Metformin 1000 mg BID PTA, occasionally takes Glimepiride if her sugar is high but has not been taking insulin. Pt checks fingersticks, typically 100-120s but recently elevated which pt believes is secondary to infection. Pt does not consume sugar sweetened beverages or desserts and monitors carbohydrate portions. Pt reports decreased appetite/PO intake since March with resultant subjective unintentional 25 lb weight loss.     Endocrinology following, discharge plan TBD. S/p bedside swallow assessment (5/26) recommending regular solids/thin liquids, in line with current diet order. Pt with fair PO intake in house, is not completing meals and vocalizes multiple food preferences. Pt amenable to trial of Glucerna shake to supplement PO intake, prefers Berry flavor. Pt declines nutrition education at this time, is fixated on menu choices and requesting bagels.    Pt is actively ordered for PhosLo however labs notable for low phosphorus. No noted GI distress, last BM 5/26 per flowsheets, no bowel regimen ordered at this time. Pt continues on IVF.

## 2022-05-27 NOTE — DIETITIAN INITIAL EVALUATION ADULT - ADD RECOMMEND
1) Recommend continue current diet, which remains appropriate at this time.   2) Recommend Glucerna 1 PO 2x daily (provides 220 kcal, 10 gm protein per 8oz serving).  3) Reviewed menu ordering system, obtained food preferences and will provide as able.  4) Pt declines nutrition education, RDN remains available to provide as appropriate. Encouraged medication compliance.

## 2022-05-27 NOTE — PROGRESS NOTE ADULT - PROBLEM SELECTOR PLAN 9
Patient lives by herself and does not have assistance at home.  Social work consult for possible HHA, however PT reccommending rehab- will plan to set up after then

## 2022-05-27 NOTE — PROGRESS NOTE ADULT - ASSESSMENT
61 yo woman with diabetes, h/o UTI presented 5/24 with chills, nausea, flank pain found to have leucocytosis, pyuria, E coli in blood and CT evidence pyelonephritis    h/o allergy to penicillin (nausea)    Pyelonephritis, E coli sepsis/ bacteremia, leucocytosis  Improving    Suggest:    can de-escalate to ceftriaxone 2 gm iv q 24 h - monitor closely during first infusion for any allergic reaction  if tolerates and remains afebrile can transition to keflex 500 mg po q 6 h on 5/30 to continue through 6/4 (total 10 days tx)    ID service available over weekend  Please call with questions/ concerns

## 2022-05-27 NOTE — DIETITIAN INITIAL EVALUATION ADULT - OTHER CALCULATIONS
Dosing weight (5/25) 132.7 lbs. No weight history available via chart.  No dosing height available at this time, obtained via HIE 63 inches.   Ideal Body Weight: 115 lbs / 52.3 kg +/10-%

## 2022-05-27 NOTE — PROGRESS NOTE ADULT - SUBJECTIVE AND OBJECTIVE BOX
PROGRESS NOTE:     Patient is a 62y old  Female who presents with a chief complaint of Pyelonephritis (26 May 2022 15:26)      SUBJECTIVE / OVERNIGHT EVENTS:    ADDITIONAL REVIEW OF SYSTEMS:    MEDICATIONS  (STANDING):  dextrose 5%. 1000 milliLiter(s) (50 mL/Hr) IV Continuous <Continuous>  dextrose 5%. 1000 milliLiter(s) (100 mL/Hr) IV Continuous <Continuous>  dextrose 50% Injectable 25 Gram(s) IV Push once  dextrose 50% Injectable 12.5 Gram(s) IV Push once  dextrose 50% Injectable 25 Gram(s) IV Push once  enoxaparin Injectable 40 milliGRAM(s) SubCutaneous every 24 hours  glucagon  Injectable 1 milliGRAM(s) IntraMuscular once  insulin glargine Injectable (LANTUS) 12 Unit(s) SubCutaneous at bedtime  insulin lispro (ADMELOG) corrective regimen sliding scale   SubCutaneous three times a day before meals  insulin lispro (ADMELOG) corrective regimen sliding scale   SubCutaneous at bedtime  lactated ringers. 1000 milliLiter(s) (100 mL/Hr) IV Continuous <Continuous>  lactated ringers. 1000 milliLiter(s) (100 mL/Hr) IV Continuous <Continuous>  lactated ringers. 1000 milliLiter(s) (100 mL/Hr) IV Continuous <Continuous>  meropenem  IVPB 1000 milliGRAM(s) IV Intermittent every 8 hours  mupirocin 2% Ointment 1 Application(s) Both Nostrils two times a day  sodium chloride 0.9% lock flush 3 milliLiter(s) IV Push every 8 hours    MEDICATIONS  (PRN):  acetaminophen     Tablet .. 650 milliGRAM(s) Oral every 6 hours PRN Temp greater or equal to 38C (100.4F), Mild Pain (1 - 3), Moderate Pain (4 - 6)  dextrose Oral Gel 15 Gram(s) Oral once PRN Blood Glucose LESS THAN 70 milliGRAM(s)/deciliter  ondansetron Injectable 4 milliGRAM(s) IV Push every 8 hours PRN Nausea and/or Vomiting  ondansetron Injectable 4 milliGRAM(s) IV Push every 8 hours PRN Nausea and/or Vomiting      CAPILLARY BLOOD GLUCOSE      POCT Blood Glucose.: 284 mg/dL (26 May 2022 22:16)  POCT Blood Glucose.: 247 mg/dL (26 May 2022 18:14)  POCT Blood Glucose.: 221 mg/dL (26 May 2022 12:01)  POCT Blood Glucose.: 201 mg/dL (26 May 2022 08:43)    I&O's Summary    26 May 2022 07:01  -  27 May 2022 07:00  --------------------------------------------------------  IN: 740 mL / OUT: 0 mL / NET: 740 mL        PHYSICAL EXAM:  Vital Signs Last 24 Hrs  T(C): 36.6 (27 May 2022 06:00), Max: 37.9 (26 May 2022 17:55)  T(F): 97.9 (27 May 2022 06:00), Max: 100.3 (26 May 2022 17:55)  HR: 80 (27 May 2022 06:00) (65 - 99)  BP: 123/59 (27 May 2022 06:00) (99/48 - 131/62)  BP(mean): --  RR: 16 (27 May 2022 06:00) (16 - 18)  SpO2: 100% (27 May 2022 06:00) (100% - 100%)    CONSTITUTIONAL: NAD, well-developed  RESPIRATORY: Normal respiratory effort; lungs are clear to auscultation bilaterally  CARDIOVASCULAR: Regular rate and rhythm, normal S1 and S2, no murmur/rub/gallop; No lower extremity edema; Peripheral pulses are 2+ bilaterally  ABDOMEN: Nontender to palpation, normoactive bowel sounds, no rebound/guarding  MUSCULOSKELETAL: no clubbing or cyanosis of digits; no joint swelling or tenderness to palpation  PSYCH: A+O to person, place, and time; affect appropriate    LABS:                        9.4    5.65  )-----------( 109      ( 27 May 2022 05:30 )             29.2     05-27    137  |  105  |  9   ----------------------------<  139<H>  3.8   |  26  |  0.69    Ca    8.3<L>      27 May 2022 05:30  Phos  2.2     05-27  Mg     1.90     05-27    TPro  6.5  /  Alb  2.4<L>  /  TBili  0.3  /  DBili  x   /  AST  21  /  ALT  16  /  AlkPhos  74  05-27              Culture - Blood (collected 24 May 2022 21:45)  Source: .Blood Blood-Venous  Gram Stain (26 May 2022 02:16):    Growth in aerobic bottle: Gram Negative Rods    Growth in anaerobic bottle: Gram Negative Rods  Preliminary Report (26 May 2022 21:02):    Growth in aerobic and anaerobic bottles: Escherichia coli    ***Blood Panel PCR results on this specimen are available    approximately 3 hours after the Gram stain result.***    Gram stain, PCR, and/or culture results may not always    correspond due to difference in methodologies.    ************************************************************    This PCR assay was performed by multiplex PCR. This    Assay tests for 66 bacterial and resistance gene targets.    Please refer to the [facility] test directory    at [email or web address] for details.  Organism: Blood Culture PCR (25 May 2022 13:31)  Organism: Blood Culture PCR (25 May 2022 13:31)    Culture - Blood (collected 24 May 2022 21:45)  Source: .Blood Blood-Peripheral  Gram Stain (26 May 2022 05:04):    Growth in aerobic bottle: Gram Negative Rods    Growth in anaerobic bottle: Gram Negative Rods  Preliminary Report (26 May 2022 13:29):    Growth in aerobic bottle: Escherichia coli    See previous culture [id number]    Growth in anaerobic bottle: Gram Negative Rods    Culture - Urine (collected 24 May 2022 20:00)  Source: Clean Catch Clean Catch (Midstream)  Preliminary Report (25 May 2022 21:45):    >100,000 CFU/ml Escherichia coli        RADIOLOGY & ADDITIONAL TESTS:  Results Reviewed:   Imaging Personally Reviewed:  Electrocardiogram Personally Reviewed:    COORDINATION OF CARE:  Care Discussed with Consultants/Other Providers [Y/N]:  Prior or Outpatient Records Reviewed [Y/N]:   PROGRESS NOTE:     Patient is a 62y old  Female who presents with a chief complaint of Pyelonephritis (26 May 2022 15:26)      SUBJECTIVE / OVERNIGHT EVENTS: No acute events overnight. This Am patient seen and examined at bedside. Patient complains of a headaches and feels lightheaded.     ADDITIONAL REVIEW OF SYSTEMS: Negative     MEDICATIONS  (STANDING):  dextrose 5%. 1000 milliLiter(s) (50 mL/Hr) IV Continuous <Continuous>  dextrose 5%. 1000 milliLiter(s) (100 mL/Hr) IV Continuous <Continuous>  dextrose 50% Injectable 25 Gram(s) IV Push once  dextrose 50% Injectable 12.5 Gram(s) IV Push once  dextrose 50% Injectable 25 Gram(s) IV Push once  enoxaparin Injectable 40 milliGRAM(s) SubCutaneous every 24 hours  glucagon  Injectable 1 milliGRAM(s) IntraMuscular once  insulin glargine Injectable (LANTUS) 12 Unit(s) SubCutaneous at bedtime  insulin lispro (ADMELOG) corrective regimen sliding scale   SubCutaneous three times a day before meals  insulin lispro (ADMELOG) corrective regimen sliding scale   SubCutaneous at bedtime  lactated ringers. 1000 milliLiter(s) (100 mL/Hr) IV Continuous <Continuous>  lactated ringers. 1000 milliLiter(s) (100 mL/Hr) IV Continuous <Continuous>  lactated ringers. 1000 milliLiter(s) (100 mL/Hr) IV Continuous <Continuous>  meropenem  IVPB 1000 milliGRAM(s) IV Intermittent every 8 hours  mupirocin 2% Ointment 1 Application(s) Both Nostrils two times a day  sodium chloride 0.9% lock flush 3 milliLiter(s) IV Push every 8 hours    MEDICATIONS  (PRN):  acetaminophen     Tablet .. 650 milliGRAM(s) Oral every 6 hours PRN Temp greater or equal to 38C (100.4F), Mild Pain (1 - 3), Moderate Pain (4 - 6)  dextrose Oral Gel 15 Gram(s) Oral once PRN Blood Glucose LESS THAN 70 milliGRAM(s)/deciliter  ondansetron Injectable 4 milliGRAM(s) IV Push every 8 hours PRN Nausea and/or Vomiting  ondansetron Injectable 4 milliGRAM(s) IV Push every 8 hours PRN Nausea and/or Vomiting      CAPILLARY BLOOD GLUCOSE      POCT Blood Glucose.: 284 mg/dL (26 May 2022 22:16)  POCT Blood Glucose.: 247 mg/dL (26 May 2022 18:14)  POCT Blood Glucose.: 221 mg/dL (26 May 2022 12:01)  POCT Blood Glucose.: 201 mg/dL (26 May 2022 08:43)    I&O's Summary    26 May 2022 07:01  -  27 May 2022 07:00  --------------------------------------------------------  IN: 740 mL / OUT: 0 mL / NET: 740 mL        PHYSICAL EXAM:  Vital Signs Last 24 Hrs  T(C): 36.6 (27 May 2022 06:00), Max: 37.9 (26 May 2022 17:55)  T(F): 97.9 (27 May 2022 06:00), Max: 100.3 (26 May 2022 17:55)  HR: 80 (27 May 2022 06:00) (65 - 99)  BP: 123/59 (27 May 2022 06:00) (99/48 - 131/62)  BP(mean): --  RR: 16 (27 May 2022 06:00) (16 - 18)  SpO2: 100% (27 May 2022 06:00) (100% - 100%)    GENERAL: NAD   HEAD:  Atraumatic, Normocephalic  EYES: EOMI, PERRL, conjunctiva and sclera clear  CHEST/LUNG: Clear to auscultation bilaterally; No wheeze  HEART: Regular rate and rhythm; No murmurs, rubs, or gallops  ABDOMEN: CV tenderness and suprapubic tenderness   EXTREMITIES:  2+ Peripheral Pulses, No clubbing, cyanosis, or edema  PSYCH: AAOx3  NEUROLOGY: non-focal  SKIN: No rashes or lesions    LABS:                        9.4    5.65  )-----------( 109      ( 27 May 2022 05:30 )             29.2     05-27    137  |  105  |  9   ----------------------------<  139<H>  3.8   |  26  |  0.69    Ca    8.3<L>      27 May 2022 05:30  Phos  2.2     05-27  Mg     1.90     05-27    TPro  6.5  /  Alb  2.4<L>  /  TBili  0.3  /  DBili  x   /  AST  21  /  ALT  16  /  AlkPhos  74  05-27              Culture - Blood (collected 24 May 2022 21:45)  Source: .Blood Blood-Venous  Gram Stain (26 May 2022 02:16):    Growth in aerobic bottle: Gram Negative Rods    Growth in anaerobic bottle: Gram Negative Rods  Preliminary Report (26 May 2022 21:02):    Growth in aerobic and anaerobic bottles: Escherichia coli    ***Blood Panel PCR results on this specimen are available    approximately 3 hours after the Gram stain result.***    Gram stain, PCR, and/or culture results may not always    correspond due to difference in methodologies.    ************************************************************    This PCR assay was performed by multiplex PCR. This    Assay tests for 66 bacterial and resistance gene targets.    Please refer to the [facility] test directory    at [email or web address] for details.  Organism: Blood Culture PCR (25 May 2022 13:31)  Organism: Blood Culture PCR (25 May 2022 13:31)    Culture - Blood (collected 24 May 2022 21:45)  Source: .Blood Blood-Peripheral  Gram Stain (26 May 2022 05:04):    Growth in aerobic bottle: Gram Negative Rods    Growth in anaerobic bottle: Gram Negative Rods  Preliminary Report (26 May 2022 13:29):    Growth in aerobic bottle: Escherichia coli    See previous culture [id number]    Growth in anaerobic bottle: Gram Negative Rods    Culture - Urine (collected 24 May 2022 20:00)  Source: Clean Catch Clean Catch (Midstream)  Preliminary Report (25 May 2022 21:45):    >100,000 CFU/ml Escherichia coli        RADIOLOGY & ADDITIONAL TESTS:  Results Reviewed:   Imaging Personally Reviewed:  Electrocardiogram Personally Reviewed:    COORDINATION OF CARE:  Care Discussed with Consultants/Other Providers [Y/N]:  Prior or Outpatient Records Reviewed [Y/N]:

## 2022-05-27 NOTE — DIETITIAN INITIAL EVALUATION ADULT - PERTINENT LABORATORY DATA
(5/27) Na 137, BUN 9, Cr 0.69, <H>, K+ 3.8, Phos 2.2<L>, Mg 1.90, Alk Phos 74, ALT/SGPT 16, AST/SGOT 21  (5/25) HbA1c 9.3%<H>  POCT: (5/27) 134-263, (5/26) 201-284

## 2022-05-27 NOTE — PROGRESS NOTE ADULT - PROBLEM SELECTOR PLAN 10
Med rec pharmacist emailed regarding medications.

## 2022-05-27 NOTE — PROGRESS NOTE ADULT - ASSESSMENT
61 y/o M with PMH of Type 2 Diabetes on Metformin, Hypertension on Ramipril presents to the ED complaining of bilateral flank and lower abdominal pain admitted for pyelonephritis on meropenem 61 y/o M with PMH of Type 2 Diabetes on Metformin, Hypertension on Ramipril presents to the ED complaining of bilateral flank and lower abdominal pain admitted for pyelonephritis.

## 2022-05-27 NOTE — PROGRESS NOTE ADULT - PROBLEM SELECTOR PLAN 8
Patient endorsing 25 pound weight loss in the last 2 months.  Patient endorses poor intake.  Encourage PO intake.  - nutrition consult

## 2022-05-27 NOTE — DIETITIAN INITIAL EVALUATION ADULT - PERTINENT MEDS FT
calcium acetate, LANTUS 12U qHS, ADMELOG corrective regimen sliding scale, lactated ringers IV Continuous, meropenem IVPB, ondansetron IV PRN

## 2022-05-27 NOTE — PROGRESS NOTE ADULT - PROBLEM SELECTOR PLAN 7
Patient with Na of 132.  Likely in setting of poor PO intake.  Continue to trend. Lovenox 40 mg subcutaneous daily.    Diet: DASH/CC  Speech and swallow evaluation: regular diet, thin liquids  Dispo: Patient lives by herself and does not have assistance at home.  Social work consult for possible HHA, however PT recommending rehab- will plan to set up after then

## 2022-05-27 NOTE — PROGRESS NOTE ADULT - PROBLEM SELECTOR PLAN 1
Admit to medicine, continue monitoring.  Hx of recurrent UTI   CTAP w/IV contrast showing Heterogeneous enhancement of the bilateral kidneys, suspicious for pyelonephritis.  UA showing large leukocyte esterase.  Per ED note' patient's last urine culture grew e-coli resistant to Levaquin ,bactrim and Cipro.  Patient s/p aztreonam and vancomycin in ED.  - Patient started on Meropenem and Vanco, vanc d/c'ed 5/26 AM due to elevated vanc level, no indication for vanc at this time per ID  - Follow up Urine culture.  - Adjust abx accordingly  - ID consulted f/u abx recs Patient meets sepsis criteria due to fever and leukocytosis.  Sepsis 2/2 E Coli bacteremia and UTI  Blood cx 5/24 positive for E Ecoli, urine culture with E. coli   Vitals q4h.  s/p 3L IVF   Monitor closely.  On alysia, held vancomycin on 5/26  - ID consulted Patient meets sepsis criteria due to fever and leukocytosis.  Sepsis 2/2 E Coli bacteremia and UTI  Blood cx 5/24 positive for E Ecoli, urine culture with E. coli   s/p 3L IVF   - per ID switched to ceftriaxone 5/27

## 2022-05-27 NOTE — PROGRESS NOTE ADULT - PROBLEM SELECTOR PLAN 2
Patient meets sepsis criteria due to fever and leukocytosis.  Sepsis 2/2 E Coli bacteremia and UTI  Blood cx 5/24 positive for E Ecoli   Vitals q4h.  s/p 3L IVF   Monitor closely.  On alysia, held vancomycin on 5/26    - Lactate improve to 2  - will give additional LR 1200mL and repeat lactate in AM  - follow blood cx sensitivities and urine cx  - ID consulted Admit to medicine, continue monitoring.  Hx of recurrent UTI   CTAP w/IV contrast showing Heterogeneous enhancement of the bilateral kidneys, suspicious for pyelonephritis.  UA showing large leukocyte esterase.  Per ED note' patient's last urine culture grew e-coli resistant to Levaquin ,bactrim and Cipro.  Patient s/p aztreonam and vancomycin in ED.  - Patient started on Meropenem and Vanco, vanc d/c'ed 5/26 AM due to elevated vanc level, no indication for vanc at this time per ID  - urine culture with E. coli, follow up sensitivities   - Adjust abx accordingly  - ID consulted f/u abx recs Hx of recurrent UTI   CTAP w/IV contrast showing Heterogeneous enhancement of the bilateral kidneys, suspicious for pyelonephritis.  UA showing large leukocyte esterase.  Per ED note' patient's last urine culture grew e-coli resistant to Levaquin ,bactrim and Cipro.  Patient s/p aztreonam and vancomycin in ED.  - Patient started on Meropenem and Vanco, vanc d/c'ed 5/26 AM due to elevated vanc level, no indication for vanc at this time per ID  - urine culture with E. coli, follow up sensitivities   - per ID switched to ceftriaxone

## 2022-05-27 NOTE — PROGRESS NOTE ADULT - PROBLEM SELECTOR PLAN 5
Patient with platelet count of 120. Platelet count 137 in February 2017.  Will order platelet blue top count- true thrombocytopenia   Continue trending.    - likely in setting of sepsis Patient reports being on metformin 1000 mg BID. Patient does not remember other diabetic medications.  Med rec pharmacist emailed.  Will hold metformin while inpatient.  Low dose insulin sliding scale ordered.  Consistent carbohydrate diet ordered.  HgbA1c 9.3  - endocrine consult, on lantus 12 for now

## 2022-05-27 NOTE — PROGRESS NOTE ADULT - PROBLEM SELECTOR PLAN 4
Patient reporting dizziness since march.  Possibly in setting of poor PO intake.  Orthostatics were negative on 5/26  Patient on LR at 100 ml/hr for 24 hours  If no improvement consider outpatient workup with TTE Patient with platelet count of 120. Platelet count 137 in February 2017.  platelet blue top count- true thrombocytopenia   Continue trending.  - likely in setting of sepsis

## 2022-05-27 NOTE — PROGRESS NOTE ADULT - PROBLEM SELECTOR PLAN 6
Patient reports being on metformin 1000 mg BID. Patient does not remember other diabetic medications.  Med rec pharmacist emailed.  Will hold metformin while inpatient.  Low dose insulin sliding scale ordered.  Consistent carbohydrate diet ordered.  HgbA1c 9.3    - endocrine consult Patient endorsing 25 pound weight loss in the last 2 months.  Patient endorses poor intake.  Encourage PO intake.  - nutrition consult

## 2022-05-27 NOTE — PROGRESS NOTE ADULT - PROBLEM SELECTOR PLAN 11
Lovenox 40 mg subcutaneous daily.    Diet: DASH/CC  Speech and swallow evaluation: regular diet, thin liquids

## 2022-05-28 LAB
ALBUMIN SERPL ELPH-MCNC: 2.4 G/DL — LOW (ref 3.3–5)
ALP SERPL-CCNC: 92 U/L — SIGNIFICANT CHANGE UP (ref 40–120)
ALT FLD-CCNC: 16 U/L — SIGNIFICANT CHANGE UP (ref 4–33)
ANION GAP SERPL CALC-SCNC: 8 MMOL/L — SIGNIFICANT CHANGE UP (ref 7–14)
AST SERPL-CCNC: 25 U/L — SIGNIFICANT CHANGE UP (ref 4–32)
BASE EXCESS BLDV CALC-SCNC: 4.7 MMOL/L — HIGH (ref -2–3)
BILIRUB SERPL-MCNC: 0.2 MG/DL — SIGNIFICANT CHANGE UP (ref 0.2–1.2)
BLOOD GAS VENOUS COMPREHENSIVE RESULT: SIGNIFICANT CHANGE UP
BUN SERPL-MCNC: 9 MG/DL — SIGNIFICANT CHANGE UP (ref 7–23)
CALCIUM SERPL-MCNC: 8.5 MG/DL — SIGNIFICANT CHANGE UP (ref 8.4–10.5)
CHLORIDE BLDV-SCNC: 104 MMOL/L — SIGNIFICANT CHANGE UP (ref 96–108)
CHLORIDE SERPL-SCNC: 101 MMOL/L — SIGNIFICANT CHANGE UP (ref 98–107)
CO2 BLDV-SCNC: 31.2 MMOL/L — HIGH (ref 22–26)
CO2 SERPL-SCNC: 26 MMOL/L — SIGNIFICANT CHANGE UP (ref 22–31)
CREAT SERPL-MCNC: 0.71 MG/DL — SIGNIFICANT CHANGE UP (ref 0.5–1.3)
EGFR: 97 ML/MIN/1.73M2 — SIGNIFICANT CHANGE UP
GAS PNL BLDV: 137 MMOL/L — SIGNIFICANT CHANGE UP (ref 136–145)
GLUCOSE BLDV-MCNC: 173 MG/DL — HIGH (ref 70–99)
GLUCOSE SERPL-MCNC: 181 MG/DL — HIGH (ref 70–99)
HCO3 BLDV-SCNC: 30 MMOL/L — HIGH (ref 22–29)
HCT VFR BLD CALC: 29.7 % — LOW (ref 34.5–45)
HCT VFR BLDA CALC: 26 % — LOW (ref 34.5–46.5)
HGB BLD CALC-MCNC: 8.8 G/DL — LOW (ref 11.5–15.5)
HGB BLD-MCNC: 9.5 G/DL — LOW (ref 11.5–15.5)
LACTATE BLDV-MCNC: 1.4 MMOL/L — SIGNIFICANT CHANGE UP (ref 0.5–2)
MAGNESIUM SERPL-MCNC: 1.8 MG/DL — SIGNIFICANT CHANGE UP (ref 1.6–2.6)
MCHC RBC-ENTMCNC: 27.6 PG — SIGNIFICANT CHANGE UP (ref 27–34)
MCHC RBC-ENTMCNC: 32 GM/DL — SIGNIFICANT CHANGE UP (ref 32–36)
MCV RBC AUTO: 86.3 FL — SIGNIFICANT CHANGE UP (ref 80–100)
NRBC # BLD: 0 /100 WBCS — SIGNIFICANT CHANGE UP
NRBC # FLD: 0 K/UL — SIGNIFICANT CHANGE UP
PCO2 BLDV: 46 MMHG — HIGH (ref 39–42)
PH BLDV: 7.42 — SIGNIFICANT CHANGE UP (ref 7.32–7.43)
PHOSPHATE SERPL-MCNC: 2.2 MG/DL — LOW (ref 2.5–4.5)
PLATELET # BLD AUTO: 118 K/UL — LOW (ref 150–400)
PO2 BLDV: 65 MMHG — SIGNIFICANT CHANGE UP
POTASSIUM BLDV-SCNC: 4 MMOL/L — SIGNIFICANT CHANGE UP (ref 3.5–5.1)
POTASSIUM SERPL-MCNC: 4 MMOL/L — SIGNIFICANT CHANGE UP (ref 3.5–5.3)
POTASSIUM SERPL-SCNC: 4 MMOL/L — SIGNIFICANT CHANGE UP (ref 3.5–5.3)
PROT SERPL-MCNC: 6.4 G/DL — SIGNIFICANT CHANGE UP (ref 6–8.3)
RBC # BLD: 3.44 M/UL — LOW (ref 3.8–5.2)
RBC # FLD: 15 % — HIGH (ref 10.3–14.5)
SAO2 % BLDV: 95 % — SIGNIFICANT CHANGE UP
SODIUM SERPL-SCNC: 135 MMOL/L — SIGNIFICANT CHANGE UP (ref 135–145)
WBC # BLD: 5.1 K/UL — SIGNIFICANT CHANGE UP (ref 3.8–10.5)
WBC # FLD AUTO: 5.1 K/UL — SIGNIFICANT CHANGE UP (ref 3.8–10.5)

## 2022-05-28 PROCEDURE — 99232 SBSQ HOSP IP/OBS MODERATE 35: CPT | Mod: GC

## 2022-05-28 RX ADMIN — Medication 3 UNIT(S): at 17:57

## 2022-05-28 RX ADMIN — Medication 1 TABLET(S): at 12:29

## 2022-05-28 RX ADMIN — Medication 650 MILLIGRAM(S): at 01:54

## 2022-05-28 RX ADMIN — SODIUM CHLORIDE 3 MILLILITER(S): 9 INJECTION INTRAMUSCULAR; INTRAVENOUS; SUBCUTANEOUS at 06:10

## 2022-05-28 RX ADMIN — Medication 650 MILLIGRAM(S): at 02:30

## 2022-05-28 RX ADMIN — Medication 650 MILLIGRAM(S): at 13:30

## 2022-05-28 RX ADMIN — Medication 650 MILLIGRAM(S): at 20:29

## 2022-05-28 RX ADMIN — Medication 3 UNIT(S): at 09:00

## 2022-05-28 RX ADMIN — Medication 650 MILLIGRAM(S): at 12:34

## 2022-05-28 RX ADMIN — MUPIROCIN 1 APPLICATION(S): 20 OINTMENT TOPICAL at 18:52

## 2022-05-28 RX ADMIN — Medication 1: at 08:59

## 2022-05-28 RX ADMIN — Medication 650 MILLIGRAM(S): at 21:29

## 2022-05-28 RX ADMIN — Medication 3 UNIT(S): at 12:48

## 2022-05-28 RX ADMIN — CEFTRIAXONE 100 MILLIGRAM(S): 500 INJECTION, POWDER, FOR SOLUTION INTRAMUSCULAR; INTRAVENOUS at 17:20

## 2022-05-28 RX ADMIN — SODIUM CHLORIDE 3 MILLILITER(S): 9 INJECTION INTRAMUSCULAR; INTRAVENOUS; SUBCUTANEOUS at 13:56

## 2022-05-28 RX ADMIN — Medication 2: at 17:57

## 2022-05-28 RX ADMIN — Medication 1: at 12:48

## 2022-05-28 RX ADMIN — INSULIN GLARGINE 12 UNIT(S): 100 INJECTION, SOLUTION SUBCUTANEOUS at 22:48

## 2022-05-28 RX ADMIN — SODIUM CHLORIDE 3 MILLILITER(S): 9 INJECTION INTRAMUSCULAR; INTRAVENOUS; SUBCUTANEOUS at 22:16

## 2022-05-28 RX ADMIN — Medication 667 MILLIGRAM(S): at 09:01

## 2022-05-28 NOTE — PROGRESS NOTE ADULT - SUBJECTIVE AND OBJECTIVE BOX
PROGRESS NOTE:   Authoted by Dr. Alyson Rob MD    Pager [phone] [facility], [id number] [facility]     Patient is a 62y old  Female who presents with a chief complaint of Pyelonephritis (27 May 2022 17:00)    SUBJECTIVE / OVERNIGHT EVENTS: No significant overnight events. Pt endorses improving suprapubic and b/l lower quadrant abdominal pain, mild urgency to urinate but no pain upon urination. Pt denies chest pain, palpitations, SOB, dizziness/lightheadedhess, syncope, diarrhea/melena/BRBPR, hematuria, focal deficits/weakness/change in sensation.     MEDICATIONS  (STANDING):  cefTRIAXone   IVPB 2000 milliGRAM(s) IV Intermittent every 24 hours  dextrose 5%. 1000 milliLiter(s) (100 mL/Hr) IV Continuous <Continuous>  dextrose 5%. 1000 milliLiter(s) (50 mL/Hr) IV Continuous <Continuous>  dextrose 50% Injectable 25 Gram(s) IV Push once  dextrose 50% Injectable 12.5 Gram(s) IV Push once  dextrose 50% Injectable 25 Gram(s) IV Push once  enoxaparin Injectable 40 milliGRAM(s) SubCutaneous every 24 hours  glucagon  Injectable 1 milliGRAM(s) IntraMuscular once  insulin glargine Injectable (LANTUS) 12 Unit(s) SubCutaneous at bedtime  insulin lispro (ADMELOG) corrective regimen sliding scale   SubCutaneous three times a day before meals  insulin lispro (ADMELOG) corrective regimen sliding scale   SubCutaneous at bedtime  insulin lispro Injectable (ADMELOG) 3 Unit(s) SubCutaneous three times a day before meals  lactobacillus acidophilus 1 Tablet(s) Oral daily  mupirocin 2% Ointment 1 Application(s) Both Nostrils two times a day  sodium chloride 0.9% lock flush 3 milliLiter(s) IV Push every 8 hours    MEDICATIONS  (PRN):  acetaminophen     Tablet .. 650 milliGRAM(s) Oral every 6 hours PRN Temp greater or equal to 38C (100.4F), Mild Pain (1 - 3), Moderate Pain (4 - 6)  dextrose Oral Gel 15 Gram(s) Oral once PRN Blood Glucose LESS THAN 70 milliGRAM(s)/deciliter  ondansetron Injectable 4 milliGRAM(s) IV Push every 8 hours PRN Nausea and/or Vomiting      CAPILLARY BLOOD GLUCOSE      POCT Blood Glucose.: 193 mg/dL (28 May 2022 12:31)  POCT Blood Glucose.: 167 mg/dL (28 May 2022 08:48)  POCT Blood Glucose.: 260 mg/dL (27 May 2022 22:08)  POCT Blood Glucose.: 184 mg/dL (27 May 2022 17:45)    I&O's Summary    27 May 2022 07:01  -  28 May 2022 07:00  --------------------------------------------------------  IN: 360 mL / OUT: 0 mL / NET: 360 mL    PHYSICAL EXAM:  Vital Signs Last 24 Hrs  T(C): 36.9 (28 May 2022 12:11), Max: 37 (27 May 2022 17:31)  T(F): 98.4 (28 May 2022 12:11), Max: 98.6 (27 May 2022 17:31)  HR: 74 (28 May 2022 12:11) (67 - 74)  BP: 161/76 (28 May 2022 12:11) (121/64 - 161/76)  RR: 18 (28 May 2022 12:11) (17 - 18)  SpO2: 100% (28 May 2022 12:11) (100% - 100%)    GENERAL: NAD   HEAD:  Atraumatic, Normocephalic  EYES: EOMI, PERRL, conjunctiva and sclera clear  CHEST/LUNG: Clear to auscultation bilaterally; No wheeze  HEART: Regular rate and rhythm; No murmurs, rubs, or gallops  ABDOMEN: CV tenderness and suprapubic tenderness   EXTREMITIES:  2+ Peripheral Pulses, No clubbing, cyanosis, or edema  PSYCH: AAOx3  NEUROLOGY: non-focal  SKIN: No rashes or lesions    LABS:                        9.5    5.10  )-----------( 118      ( 28 May 2022 05:28 )             29.7     05-28    135  |  101  |  9   ----------------------------<  181<H>  4.0   |  26  |  0.71    Ca    8.5      28 May 2022 05:28  Phos  2.2     05-28  Mg     1.80     05-28    TPro  6.4  /  Alb  2.4<L>  /  TBili  0.2  /  DBili  x   /  AST  25  /  ALT  16  /  AlkPhos  92  05-28    Culture - Blood (collected 26 May 2022 05:10)  Source: .Blood Blood-Venous  Preliminary Report (27 May 2022 13:02):    No growth to date.    Culture - Blood (collected 26 May 2022 05:00)  Source: .Blood Blood-Peripheral  Preliminary Report (27 May 2022 13:02):    No growth to date.    RADIOLOGY & ADDITIONAL TESTS:  No new imaging or tests    COORDINATION OF CARE:  Care Discussed with Consultants/Other Providers [Y/N]:  Prior or Outpatient Records Reviewed [Y/N]:

## 2022-05-28 NOTE — PROGRESS NOTE ADULT - PROBLEM SELECTOR PLAN 1
Patient meets sepsis criteria due to fever and leukocytosis.  Sepsis 2/2 E Coli bacteremia and UTI  Blood cx 5/24 positive for E Ecoli, urine culture with E. coli   s/p 3L IVF   - per ID switched to ceftriaxone 5/27 2g QD, will continue for 10 day course

## 2022-05-28 NOTE — PROGRESS NOTE ADULT - PROBLEM SELECTOR PLAN 2
Hx of recurrent UTI   CTAP w/IV contrast showing Heterogeneous enhancement of the bilateral kidneys, suspicious for pyelonephritis.  UA showing large leukocyte esterase.  Per ED note' patient's last urine culture grew e-coli resistant to Levaquin ,bactrim and Cipro.  Patient s/p aztreonam and vancomycin in ED.  - Patient started on Meropenem and Vanco, vanc d/c'ed 5/26 AM due to elevated vanc level, no indication for vanc at this time per ID  - urine culture with E. coli, follow up sensitivities   - per ID switched to ceftriaxone as above

## 2022-05-28 NOTE — PROGRESS NOTE ADULT - ATTENDING COMMENTS
Severe sepsis secondary to pyelonephritis w/ EColi bacteremia, sepsis now resolved, Ucx also with Ecoli, continue Ceftriaxone, repeat blood cultures 5/26 NGTD   Uncontrolled DM2, A1c-9.3; hold oral meds, c/w ISS, c/w Lantus 12 units qhs and Admelog 3 units TID w/ meals, monitor fingersticks   Dizziness, orthostatic neg; now improved   Thrombocytopenia, likely in setting of sepsis; platelets improving   Dispo: PT recommends rehab.

## 2022-05-28 NOTE — PROGRESS NOTE ADULT - PROBLEM SELECTOR PLAN 5
Patient reports being on metformin 1000 mg BID. Patient does not remember other diabetic medications.  Med rec pharmacist emailed.  Will hold metformin while inpatient.  Low dose insulin sliding scale ordered.  Consistent carbohydrate diet ordered.  HgbA1c 9.3  - endocrine consult, on lantus 12 for now

## 2022-05-28 NOTE — PROGRESS NOTE ADULT - PROBLEM SELECTOR PLAN 4
Patient with platelet count of 120. Platelet count 137 in February 2017.  platelet blue top count- true thrombocytopenia   Continue trending.  - likely in setting of sepsis

## 2022-05-28 NOTE — PROGRESS NOTE ADULT - PROBLEM SELECTOR PLAN 3
Patient reporting dizziness since march.  Possibly in setting of poor PO intake.  Orthostatics were negative on 5/26  Patient s/p LR at 100 ml/hr for 24 hours  will trial meclizine

## 2022-05-28 NOTE — PROGRESS NOTE ADULT - PROBLEM SELECTOR PLAN 7
Lovenox 40 mg subcutaneous daily.    Diet: DASH/CC  Speech and swallow evaluation: regular diet, thin liquids  Dispo: Patient lives by herself and does not have assistance at home.  Social work consult for possible HHA, however PT recommending rehab- will plan to set up after then

## 2022-05-29 ENCOUNTER — TRANSCRIPTION ENCOUNTER (OUTPATIENT)
Age: 62
End: 2022-05-29

## 2022-05-29 LAB
ANION GAP SERPL CALC-SCNC: 10 MMOL/L — SIGNIFICANT CHANGE UP (ref 7–14)
BASOPHILS # BLD AUTO: 0.02 K/UL — SIGNIFICANT CHANGE UP (ref 0–0.2)
BASOPHILS NFR BLD AUTO: 0.4 % — SIGNIFICANT CHANGE UP (ref 0–2)
BUN SERPL-MCNC: 7 MG/DL — SIGNIFICANT CHANGE UP (ref 7–23)
CALCIUM SERPL-MCNC: 8.9 MG/DL — SIGNIFICANT CHANGE UP (ref 8.4–10.5)
CHLORIDE SERPL-SCNC: 102 MMOL/L — SIGNIFICANT CHANGE UP (ref 98–107)
CO2 SERPL-SCNC: 26 MMOL/L — SIGNIFICANT CHANGE UP (ref 22–31)
CREAT SERPL-MCNC: 0.69 MG/DL — SIGNIFICANT CHANGE UP (ref 0.5–1.3)
EGFR: 99 ML/MIN/1.73M2 — SIGNIFICANT CHANGE UP
EOSINOPHIL # BLD AUTO: 0.22 K/UL — SIGNIFICANT CHANGE UP (ref 0–0.5)
EOSINOPHIL NFR BLD AUTO: 3.9 % — SIGNIFICANT CHANGE UP (ref 0–6)
GLUCOSE SERPL-MCNC: 147 MG/DL — HIGH (ref 70–99)
HCT VFR BLD CALC: 30.9 % — LOW (ref 34.5–45)
HGB BLD-MCNC: 10 G/DL — LOW (ref 11.5–15.5)
IANC: 3.31 K/UL — SIGNIFICANT CHANGE UP (ref 1.8–7.4)
IMM GRANULOCYTES NFR BLD AUTO: 0.4 % — SIGNIFICANT CHANGE UP (ref 0–1.5)
LYMPHOCYTES # BLD AUTO: 1.71 K/UL — SIGNIFICANT CHANGE UP (ref 1–3.3)
LYMPHOCYTES # BLD AUTO: 29.9 % — SIGNIFICANT CHANGE UP (ref 13–44)
MAGNESIUM SERPL-MCNC: 1.9 MG/DL — SIGNIFICANT CHANGE UP (ref 1.6–2.6)
MCHC RBC-ENTMCNC: 27 PG — SIGNIFICANT CHANGE UP (ref 27–34)
MCHC RBC-ENTMCNC: 32.4 GM/DL — SIGNIFICANT CHANGE UP (ref 32–36)
MCV RBC AUTO: 83.5 FL — SIGNIFICANT CHANGE UP (ref 80–100)
MONOCYTES # BLD AUTO: 0.43 K/UL — SIGNIFICANT CHANGE UP (ref 0–0.9)
MONOCYTES NFR BLD AUTO: 7.5 % — SIGNIFICANT CHANGE UP (ref 2–14)
NEUTROPHILS # BLD AUTO: 3.31 K/UL — SIGNIFICANT CHANGE UP (ref 1.8–7.4)
NEUTROPHILS NFR BLD AUTO: 57.9 % — SIGNIFICANT CHANGE UP (ref 43–77)
NRBC # BLD: 0 /100 WBCS — SIGNIFICANT CHANGE UP
NRBC # FLD: 0 K/UL — SIGNIFICANT CHANGE UP
PHOSPHATE SERPL-MCNC: 2.7 MG/DL — SIGNIFICANT CHANGE UP (ref 2.5–4.5)
PLATELET # BLD AUTO: 127 K/UL — LOW (ref 150–400)
POTASSIUM SERPL-MCNC: 3.8 MMOL/L — SIGNIFICANT CHANGE UP (ref 3.5–5.3)
POTASSIUM SERPL-SCNC: 3.8 MMOL/L — SIGNIFICANT CHANGE UP (ref 3.5–5.3)
RBC # BLD: 3.7 M/UL — LOW (ref 3.8–5.2)
RBC # FLD: 15 % — HIGH (ref 10.3–14.5)
SODIUM SERPL-SCNC: 138 MMOL/L — SIGNIFICANT CHANGE UP (ref 135–145)
WBC # BLD: 5.71 K/UL — SIGNIFICANT CHANGE UP (ref 3.8–10.5)
WBC # FLD AUTO: 5.71 K/UL — SIGNIFICANT CHANGE UP (ref 3.8–10.5)

## 2022-05-29 PROCEDURE — 99232 SBSQ HOSP IP/OBS MODERATE 35: CPT | Mod: GC

## 2022-05-29 RX ADMIN — Medication 3 UNIT(S): at 12:44

## 2022-05-29 RX ADMIN — Medication 3 UNIT(S): at 18:03

## 2022-05-29 RX ADMIN — MUPIROCIN 1 APPLICATION(S): 20 OINTMENT TOPICAL at 17:28

## 2022-05-29 RX ADMIN — Medication 3 UNIT(S): at 09:21

## 2022-05-29 RX ADMIN — SODIUM CHLORIDE 3 MILLILITER(S): 9 INJECTION INTRAMUSCULAR; INTRAVENOUS; SUBCUTANEOUS at 21:57

## 2022-05-29 RX ADMIN — INSULIN GLARGINE 12 UNIT(S): 100 INJECTION, SOLUTION SUBCUTANEOUS at 22:01

## 2022-05-29 RX ADMIN — CEFTRIAXONE 100 MILLIGRAM(S): 500 INJECTION, POWDER, FOR SOLUTION INTRAMUSCULAR; INTRAVENOUS at 17:29

## 2022-05-29 RX ADMIN — Medication 650 MILLIGRAM(S): at 18:30

## 2022-05-29 RX ADMIN — Medication 650 MILLIGRAM(S): at 17:30

## 2022-05-29 RX ADMIN — Medication 1: at 12:43

## 2022-05-29 RX ADMIN — MUPIROCIN 1 APPLICATION(S): 20 OINTMENT TOPICAL at 06:49

## 2022-05-29 RX ADMIN — SODIUM CHLORIDE 3 MILLILITER(S): 9 INJECTION INTRAMUSCULAR; INTRAVENOUS; SUBCUTANEOUS at 07:04

## 2022-05-29 RX ADMIN — Medication 1: at 18:03

## 2022-05-29 RX ADMIN — SODIUM CHLORIDE 3 MILLILITER(S): 9 INJECTION INTRAMUSCULAR; INTRAVENOUS; SUBCUTANEOUS at 13:13

## 2022-05-29 RX ADMIN — Medication 650 MILLIGRAM(S): at 23:59

## 2022-05-29 RX ADMIN — Medication 1 TABLET(S): at 12:44

## 2022-05-29 NOTE — DISCHARGE NOTE PROVIDER - NSDCCPCAREPLAN_GEN_ALL_CORE_FT
PRINCIPAL DISCHARGE DIAGNOSIS  Diagnosis: Pyelonephritis  Assessment and Plan of Treatment: You came in with infection of the kidneys, called pyelonephritis. Your blood and urine cultures grew E Coli, and you were treated with IV antibiotics.       PRINCIPAL DISCHARGE DIAGNOSIS  Diagnosis: Pyelonephritis  Assessment and Plan of Treatment: You came in with infection of the kidneys, called pyelonephritis. Your blood and urine cultures grew E Coli, and you were treated with IV antibiotics that the bacteria was sensitive to. Our infectious disease experts determined a course of antibiotics for you while you were hospitalized. You will be switched to the same type of antibiotic as an oral medication prescribed to your pharmacy to complete a course of antibiotics for your infection.   If you have persistent fevers that do not go away, or continue to have painful urination and blood in your urine, please go to the nearest ED for further evaluation.   Please follow-up with your primary care provider within 1 week of hospital discharge for further lab-work, monitoring, and management as needed for your chronic health conditions.       PRINCIPAL DISCHARGE DIAGNOSIS  Diagnosis: Pyelonephritis  Assessment and Plan of Treatment: You came into the hospital with infection of the kidneys, called pyelonephritis. This was discovered through CT imaging. Your blood and urine cultures grew E Coli, and you were treated with IV antibiotics that the bacteria was sensitive to. Our infectious disease experts determined a course of antibiotics for you while you were hospitalized. You will be switched to the same type of antibiotic as an oral medication prescribed to your pharmacy to complete a course of antibiotics for your infection.   If you have persistent fevers that do not go away, or continue to have painful urination and blood in your urine, please go to the nearest ED for further evaluation.   Please follow-up with your primary care provider within 1 week of hospital discharge for further lab-work, monitoring, and management as needed for your chronic health conditions.        SECONDARY DISCHARGE DIAGNOSES  Diagnosis: Type 2 diabetes mellitus  Assessment and Plan of Treatment: You were found to have elevated blood sugars. This is likely due to your type 2 diabetes exacerbated by the infection. You were placed on insulin in the hospital for easier control of your blood sugar but you can go back to your oral medications at home. Please stop taking the Actos as that can increase your risk of fractures. You should also not take Jardiance, an SGLT2 inhibitor, in the future as that can increase your chances for urinary infections.   From our endocrinology team, you should continue your Metformin twice a day and you will be prescribed new diabetic medication called Ozempic upon hospital discharge. Please take Ozempic as prescribed.   Please follow-up with your primary care provider within 1 week of hospital discharge as they may need to increase the dosage of Ozempic after a month of taking the initial starting dose.     PRINCIPAL DISCHARGE DIAGNOSIS  Diagnosis: Pyelonephritis  Assessment and Plan of Treatment: You came into the hospital with infection of the kidneys, called pyelonephritis. This was discovered through CT imaging. Your blood and urine cultures grew E Coli, and you were treated with IV antibiotics that the bacteria was sensitive to. Our infectious disease experts determined a course of antibiotics for you while you were hospitalized. You will be switched to the same type of antibiotic as an oral medication prescribed to your pharmacy to complete a course of antibiotics for your infection.   If you have persistent fevers that do not go away, or continue to have painful urination and blood in your urine, or severe back pain, please go to the nearest ED for further evaluation.   Please follow-up with your primary care provider within 1 week of hospital discharge for further lab-work, monitoring, and management as needed for your chronic health conditions.        SECONDARY DISCHARGE DIAGNOSES  Diagnosis: Type 2 diabetes mellitus  Assessment and Plan of Treatment: You were found to have elevated blood sugars. This is likely due to your type 2 diabetes exacerbated by the infection. You were placed on insulin in the hospital for easier control of your blood sugar but you can go back to your oral medications at home. Please stop taking the Actos as that can increase your risk of fractures. You should also not take Jardiance, an SGLT2 inhibitor, in the future as that can increase your chances for urinary infections.   From our endocrinology team, you should continue your Metformin twice a day and you will be prescribed new diabetic medication called Ozempic upon hospital discharge. Please take Ozempic as prescribed.   If you notice your blood sugars are significantly elevated and stay elevated, please call your primary care provider as they may need to adjust your medications.   Please also follow-up with your primary care provider within 1 week of hospital discharge as they may need to increase the dosage of Ozempic after a month of taking the initial starting dose.

## 2022-05-29 NOTE — DISCHARGE NOTE PROVIDER - NSDCCPTREATMENT_GEN_ALL_CORE_FT
PRINCIPAL PROCEDURE  Procedure: CT abdomen  Findings and Treatment: ACC: [id number] EXAM:  CT ABDOMEN AND PELVIS IC                        PROCEDURE DATE:  05/24/2022    INTERPRETATION:  CLINICAL INFORMATION: Left lower quadrant tenderness to   palpation, abdominal pain, nausea.  COMPARISON: None.  CONTRAST/COMPLICATIONS:  IV Contrast: Omnipaque 350. 60 cc administered. 10 cc discarded.  Oral Contrast: None.  Complications: None.  PROCEDURE:  CT of the Abdomen and Pelvis was performed.  Sagittal and coronal reformats were performed.  FINDINGS:  LOWER CHEST: Within normal limits.  LIVER: Within normal limits.  BILE DUCTS: Normal caliber.  GALLBLADDER: Within normal limits.  SPLEEN: Within normal limits.  PANCREAS: Within normal limits.  ADRENALS: Within normal limits.  KIDNEYS/URETERS: Heterogeneous enhancement of the bilateral kidneys. No   renal stones or hydronephrosis.  BLADDER: Within normal limits.  REPRODUCTIVE ORGANS: Uterus and adnexa within normal limits.  BOWEL: Colonic diverticulosis. No bowel obstruction. Appendectomy.  PERITONEUM: No ascites.  VESSELS: Atherosclerotic changes.  RETROPERITONEUM/LYMPH NODES: No lymphadenopathy.  ABDOMINAL WALL: Within normal limits.  BONES: Degenerative changes.  IMPRESSION:  Heterogeneous enhancement of the bilateral kidneys, suspicious for   pyelonephritis. Correlate with urinalysis.  --- End of Report ---

## 2022-05-29 NOTE — DISCHARGE NOTE PROVIDER - CARE PROVIDER_API CALL
Dr. Wilburn,   Please follow-up with your primary care provider within 1 week of hospital discharge.  Phone: [phone]  Fax: (   )    -  Established Patient  Follow Up Time:

## 2022-05-29 NOTE — DISCHARGE NOTE PROVIDER - HOSPITAL COURSE
62 year old woman with PMH of Type 2 Diabetes on Metformin, Hypertension on Ramipril presents to the ED complaining of bilateral flank and lower abdominal pain. Patient states pain has been occurring for 4 days. Patient reports pain has worsened. Patient endorses flank pain with radiation to abdomen. Patient also endorses dysuria, nausea and vomiting as well as fever and chills. Patient reports poor PO intake. Of note patient reports having that she has had UTI since March. Patient reports that she hospitalized at [facility] for 2 weeks and received antibiotics. Patient reports that when she was discharged she was feeling better. Patient reports that symptoms returned later and endorses poor PO intake since March. Patient endorses 25 pound weight loss since March. Patient lives on her own and reports ambulating with cane due to dizziness. Per ED note' patient's last urine culture grew e-coli resistant to Levaquin ,bactrim and Cipro. Patient was prescribed Macrobid however patient reports allergic reaction of nausea and vomiting.    In ED CTAP showed Heterogeneous enhancement of the bilateral kidneys, suspicious for pyelonephritis. UA showed large leukocytosis. Patient received vancomycin and aztreonam in ED. Urine and blood cultures were sent.      Patient initially treated with vancomycin and meropenem (in setting of penicillin allergy), patient was persistently febrile, lactate initially 4, received 4L IVF, lactate normalized to 2. Vancomycin d/c after negative MRSA swab. Blood and urine cultures grew E coli, antibiotics narrowed to ceftriaxone to complete a 10 day course.     Patient's A1c 9.3, endocrine onboard, started lantus inpatient. Patient also with dizziness since March, orthostatics negative, will pursue outpatient workup.     Patient evaluated by PT, recommend discharge to [facility]. 62 year old woman with PMH of Type 2 Diabetes on Metformin, Hypertension on Ramipril presents to the ED complaining of bilateral flank and lower abdominal pain. Patient states pain has been occurring for 4 days. Patient reports pain has worsened. Patient endorses flank pain with radiation to abdomen. Patient also endorses dysuria, nausea and vomiting as well as fever and chills. Patient reports poor PO intake. Of note patient reports having that she has had UTI since March. Patient reports that she hospitalized at [facility] for 2 weeks and received antibiotics. Patient reports that when she was discharged she was feeling better. Patient reports that symptoms returned later and endorses poor PO intake since March. Patient endorses 25 pound weight loss since March. Patient lives on her own and reports ambulating with cane due to dizziness. Per ED note' patient's last urine culture grew e-coli resistant to Levaquin ,bactrim and Cipro. Patient was prescribed Macrobid however patient reports allergic reaction of nausea and vomiting.    In ED CTAP showed Heterogeneous enhancement of the bilateral kidneys, suspicious for pyelonephritis. UA showed large leukocytosis. Patient received vancomycin and aztreonam in ED. Urine and blood cultures were sent.      Patient initially treated with vancomycin and meropenem (in setting of penicillin allergy), patient was persistently febrile, lactate initially 4, received 4L IVF, lactate normalized to 2. Vancomycin d/c after negative MRSA swab. Blood and urine cultures grew E coli, antibiotics narrowed to ceftriaxone to complete a 10 day course.     Patient's A1c 9.3, endocrine onboard, started lantus inpatient. Patient also with dizziness since March, orthostatics negative, will pursue outpatient workup.     Patient evaluated by PT, recommend discharge to [facility]. However, patient declined rehab and will be discharged home on oral antibiotics.

## 2022-05-29 NOTE — DISCHARGE NOTE PROVIDER - NSDCMRMEDTOKEN_GEN_ALL_CORE_FT
-Drink plenty of fluids and get plenty of rest.  -Take tylenol and ibuprofen as needed.  -Return to clinic in 1 week if symptoms are not improving.   Actos 30 mg oral tablet: 1 tab(s) orally once a day  Admelog 100 units/mL injectable solution: Per Pharmacy: 10 unit(s) injectable 3 times a day as directed  famotidine 20 mg oral tablet: Per Pharmacy: 1 tab(s) orally 2 times a day  glimepiride 4 mg oral tablet: 1 tab(s) orally 2 times a day (Last dispensed in Dec/2021 x 3 months).   metFORMIN 1000 mg oral tablet: 1 tab(s) orally 2 times a day  ramipril 1.25 mg oral capsule: 1 cap(s) orally once a day   Actos 30 mg oral tablet: 1 tab(s) orally once a day  Admelog 100 units/mL injectable solution: Per Pharmacy: 10 unit(s) injectable 3 times a day as directed  cephalexin 500 mg oral capsule: 1 cap(s) orally every 6 hours   famotidine 20 mg oral tablet: Per Pharmacy: 1 tab(s) orally 2 times a day  glimepiride 4 mg oral tablet: 1 tab(s) orally 2 times a day (Last dispensed in Dec/2021 x 3 months).   metFORMIN 1000 mg oral tablet: 1 tab(s) orally 2 times a day  ramipril 1.25 mg oral capsule: 1 cap(s) orally once a day  Rybelsus 3 mg oral tablet: 1 tab(s) orally once a day    cephalexin 500 mg oral capsule: 1 cap(s) orally every 6 hours   famotidine 20 mg oral tablet: Per Pharmacy: 1 tab(s) orally 2 times a day  metFORMIN 1000 mg oral tablet: 1 tab(s) orally 2 times a day  ramipril 1.25 mg oral capsule: 1 cap(s) orally once a day  Rybelsus 3 mg oral tablet: 1 tab(s) orally once a day

## 2022-05-29 NOTE — DISCHARGE NOTE PROVIDER - NSDCFUADDAPPT_GEN_ALL_CORE_FT
Please follow-up with your primary care provider within 1 week of hospital discharge for more lab-work, monitoring, and management of your chronic health conditions.

## 2022-05-29 NOTE — DISCHARGE NOTE PROVIDER - PROVIDER TOKENS
FREE:[LAST:[Dr. Wilburn],PHONE:[phone]],FAX:[(   [phone]    -],ADDRESS:[Please follow-up with your primary care provider within 1 week of hospital discharge.],ESTABLISHEDPATIENT:[T]]

## 2022-05-30 LAB
ANION GAP SERPL CALC-SCNC: 8 MMOL/L — SIGNIFICANT CHANGE UP (ref 7–14)
BASOPHILS # BLD AUTO: 0.03 K/UL — SIGNIFICANT CHANGE UP (ref 0–0.2)
BASOPHILS NFR BLD AUTO: 0.5 % — SIGNIFICANT CHANGE UP (ref 0–2)
BUN SERPL-MCNC: 7 MG/DL — SIGNIFICANT CHANGE UP (ref 7–23)
CALCIUM SERPL-MCNC: 8.8 MG/DL — SIGNIFICANT CHANGE UP (ref 8.4–10.5)
CHLORIDE SERPL-SCNC: 100 MMOL/L — SIGNIFICANT CHANGE UP (ref 98–107)
CO2 SERPL-SCNC: 28 MMOL/L — SIGNIFICANT CHANGE UP (ref 22–31)
CREAT SERPL-MCNC: 0.66 MG/DL — SIGNIFICANT CHANGE UP (ref 0.5–1.3)
EGFR: 100 ML/MIN/1.73M2 — SIGNIFICANT CHANGE UP
EOSINOPHIL # BLD AUTO: 0.25 K/UL — SIGNIFICANT CHANGE UP (ref 0–0.5)
EOSINOPHIL NFR BLD AUTO: 4.3 % — SIGNIFICANT CHANGE UP (ref 0–6)
GLUCOSE SERPL-MCNC: 155 MG/DL — HIGH (ref 70–99)
HCT VFR BLD CALC: 30.6 % — LOW (ref 34.5–45)
HGB BLD-MCNC: 9.8 G/DL — LOW (ref 11.5–15.5)
IANC: 3.02 K/UL — SIGNIFICANT CHANGE UP (ref 1.8–7.4)
IMM GRANULOCYTES NFR BLD AUTO: 0.3 % — SIGNIFICANT CHANGE UP (ref 0–1.5)
LYMPHOCYTES # BLD AUTO: 2.1 K/UL — SIGNIFICANT CHANGE UP (ref 1–3.3)
LYMPHOCYTES # BLD AUTO: 36 % — SIGNIFICANT CHANGE UP (ref 13–44)
MAGNESIUM SERPL-MCNC: 1.9 MG/DL — SIGNIFICANT CHANGE UP (ref 1.6–2.6)
MCHC RBC-ENTMCNC: 27.2 PG — SIGNIFICANT CHANGE UP (ref 27–34)
MCHC RBC-ENTMCNC: 32 GM/DL — SIGNIFICANT CHANGE UP (ref 32–36)
MCV RBC AUTO: 85 FL — SIGNIFICANT CHANGE UP (ref 80–100)
MONOCYTES # BLD AUTO: 0.41 K/UL — SIGNIFICANT CHANGE UP (ref 0–0.9)
MONOCYTES NFR BLD AUTO: 7 % — SIGNIFICANT CHANGE UP (ref 2–14)
NEUTROPHILS # BLD AUTO: 3.02 K/UL — SIGNIFICANT CHANGE UP (ref 1.8–7.4)
NEUTROPHILS NFR BLD AUTO: 51.9 % — SIGNIFICANT CHANGE UP (ref 43–77)
NRBC # BLD: 0 /100 WBCS — SIGNIFICANT CHANGE UP
NRBC # FLD: 0 K/UL — SIGNIFICANT CHANGE UP
PHOSPHATE SERPL-MCNC: 3.4 MG/DL — SIGNIFICANT CHANGE UP (ref 2.5–4.5)
PLATELET # BLD AUTO: 159 K/UL — SIGNIFICANT CHANGE UP (ref 150–400)
POTASSIUM SERPL-MCNC: 3.7 MMOL/L — SIGNIFICANT CHANGE UP (ref 3.5–5.3)
POTASSIUM SERPL-SCNC: 3.7 MMOL/L — SIGNIFICANT CHANGE UP (ref 3.5–5.3)
RBC # BLD: 3.6 M/UL — LOW (ref 3.8–5.2)
RBC # FLD: 15.2 % — HIGH (ref 10.3–14.5)
SODIUM SERPL-SCNC: 136 MMOL/L — SIGNIFICANT CHANGE UP (ref 135–145)
WBC # BLD: 5.83 K/UL — SIGNIFICANT CHANGE UP (ref 3.8–10.5)
WBC # FLD AUTO: 5.83 K/UL — SIGNIFICANT CHANGE UP (ref 3.8–10.5)

## 2022-05-30 PROCEDURE — 99232 SBSQ HOSP IP/OBS MODERATE 35: CPT | Mod: GC

## 2022-05-30 RX ADMIN — MUPIROCIN 1 APPLICATION(S): 20 OINTMENT TOPICAL at 17:42

## 2022-05-30 RX ADMIN — Medication 3 UNIT(S): at 13:00

## 2022-05-30 RX ADMIN — SODIUM CHLORIDE 3 MILLILITER(S): 9 INJECTION INTRAMUSCULAR; INTRAVENOUS; SUBCUTANEOUS at 06:04

## 2022-05-30 RX ADMIN — Medication 3 UNIT(S): at 18:40

## 2022-05-30 RX ADMIN — Medication 650 MILLIGRAM(S): at 18:39

## 2022-05-30 RX ADMIN — Medication 650 MILLIGRAM(S): at 00:59

## 2022-05-30 RX ADMIN — MUPIROCIN 1 APPLICATION(S): 20 OINTMENT TOPICAL at 05:58

## 2022-05-30 RX ADMIN — CEFTRIAXONE 100 MILLIGRAM(S): 500 INJECTION, POWDER, FOR SOLUTION INTRAMUSCULAR; INTRAVENOUS at 17:42

## 2022-05-30 RX ADMIN — INSULIN GLARGINE 12 UNIT(S): 100 INJECTION, SOLUTION SUBCUTANEOUS at 22:25

## 2022-05-30 RX ADMIN — Medication 3 UNIT(S): at 09:26

## 2022-05-30 RX ADMIN — Medication 2: at 18:39

## 2022-05-30 RX ADMIN — SODIUM CHLORIDE 3 MILLILITER(S): 9 INJECTION INTRAMUSCULAR; INTRAVENOUS; SUBCUTANEOUS at 13:16

## 2022-05-30 RX ADMIN — Medication 1 TABLET(S): at 13:00

## 2022-05-30 RX ADMIN — Medication 2: at 13:00

## 2022-05-30 RX ADMIN — ENOXAPARIN SODIUM 40 MILLIGRAM(S): 100 INJECTION SUBCUTANEOUS at 05:59

## 2022-05-30 NOTE — PROGRESS NOTE ADULT - SUBJECTIVE AND OBJECTIVE BOX
--------------------------------------------------------------  Tha Kowalski MD  PGY-2, Internal Medicine  Pager #: [phone], [phone]  After 7 PM: Night Float Pager  --------------------------------------------------------------    Patient is a 62y old  Female who presents with a chief complaint of Pyelonephritis (29 May 2022 11:38)    OVERNIGHT / INTERVAL EVENTS:    SUBJECTIVE: Patient seen and examined bedside.     Denies fevers/chills, headaches/dizziness, nausea/vomiting, chest pain, SOB, abdominal pain.     MEDICATIONS  (STANDING):  cefTRIAXone   IVPB 2000 milliGRAM(s) IV Intermittent every 24 hours  dextrose 5%. 1000 milliLiter(s) (100 mL/Hr) IV Continuous <Continuous>  dextrose 5%. 1000 milliLiter(s) (50 mL/Hr) IV Continuous <Continuous>  dextrose 50% Injectable 25 Gram(s) IV Push once  dextrose 50% Injectable 12.5 Gram(s) IV Push once  dextrose 50% Injectable 25 Gram(s) IV Push once  enoxaparin Injectable 40 milliGRAM(s) SubCutaneous every 24 hours  glucagon  Injectable 1 milliGRAM(s) IntraMuscular once  insulin glargine Injectable (LANTUS) 12 Unit(s) SubCutaneous at bedtime  insulin lispro (ADMELOG) corrective regimen sliding scale   SubCutaneous three times a day before meals  insulin lispro (ADMELOG) corrective regimen sliding scale   SubCutaneous at bedtime  insulin lispro Injectable (ADMELOG) 3 Unit(s) SubCutaneous three times a day before meals  lactobacillus acidophilus 1 Tablet(s) Oral daily  mupirocin 2% Ointment 1 Application(s) Both Nostrils two times a day  sodium chloride 0.9% lock flush 3 milliLiter(s) IV Push every 8 hours    MEDICATIONS  (PRN):  acetaminophen     Tablet .. 650 milliGRAM(s) Oral every 6 hours PRN Temp greater or equal to 38C (100.4F), Mild Pain (1 - 3), Moderate Pain (4 - 6)  dextrose Oral Gel 15 Gram(s) Oral once PRN Blood Glucose LESS THAN 70 milliGRAM(s)/deciliter  ondansetron Injectable 4 milliGRAM(s) IV Push every 8 hours PRN Nausea and/or Vomiting      CAPILLARY BLOOD GLUCOSE      POCT Blood Glucose.: 181 mg/dL (29 May 2022 21:52)  POCT Blood Glucose.: 157 mg/dL (29 May 2022 17:51)  POCT Blood Glucose.: 184 mg/dL (29 May 2022 12:24)  POCT Blood Glucose.: 149 mg/dL (29 May 2022 08:49)    I&O's Summary      PHYSICAL EXAM:  Vital Signs Last 24 Hrs  T(C): 36.8 (30 May 2022 06:04), Max: 36.8 (29 May 2022 13:09)  T(F): 98.2 (30 May 2022 06:04), Max: 98.2 (29 May 2022 13:09)  HR: 63 (30 May 2022 06:04) (63 - 71)  BP: 125/69 (30 May 2022 06:04) (125/69 - 156/62)  BP(mean): --  RR: 18 (30 May 2022 06:04) (18 - 18)  SpO2: 100% (30 May 2022 06:04) (100% - 100%)    GENERAL: No acute distress  EYES: EOMI, PERRLA; conjunctiva and sclera clear  ENMT: Moist oral mucosa  NECK: Supple, no palpable masses  RESPIRATORY: Lungs clear to ascultation b/l; unlabored respirations  CARDIOVASCULAR: Regular rate and rhythm, normal S1 and S2, no murmurs/rubs/gallops  ABDOMEN: Soft, normal bowel sounds, nondistended, nontender  MUSCULOSKELETAL: No joint swelling or tenderness to palpation  PSYCH: Affect appropriate  NEUROLOGY: AAOx3, CNs grossly intact  SKIN: No rashes; no palpable lesions    LABS:                        10.0   5.71  )-----------( 127      ( 29 May 2022 07:00 )             30.9     05-29    138  |  102  |  7   ----------------------------<  147<H>  3.8   |  26  |  0.69    Ca    8.9      29 May 2022 07:00  Phos  2.7     05-29  Mg     1.90     05-29                  INTERVAL RADIOLOGY/IMAGING STUDIES:     --------------------------------------------------------------  Tha Kowalski MD  PGY-2, Internal Medicine  Pager #: [phone], [phone]  After 7 PM: Night Float Pager  --------------------------------------------------------------    Patient is a 62y old  Female who presents with a chief complaint of Pyelonephritis (29 May 2022 11:38)    OVERNIGHT / INTERVAL EVENTS: No acute overnight events.    SUBJECTIVE: Patient seen and examined bedside. Pt endorses continued suprapubic pain as well as b/l lower quadrant abdominal pain and urgency but no dysuria and some sensation of fevers. Pt otherwise without acute new complaints.     Denies headaches/dizziness, nausea/vomiting, SOB, abdominal pain.     MEDICATIONS  (STANDING):  cefTRIAXone   IVPB 2000 milliGRAM(s) IV Intermittent every 24 hours  dextrose 5%. 1000 milliLiter(s) (100 mL/Hr) IV Continuous <Continuous>  dextrose 5%. 1000 milliLiter(s) (50 mL/Hr) IV Continuous <Continuous>  dextrose 50% Injectable 25 Gram(s) IV Push once  dextrose 50% Injectable 12.5 Gram(s) IV Push once  dextrose 50% Injectable 25 Gram(s) IV Push once  enoxaparin Injectable 40 milliGRAM(s) SubCutaneous every 24 hours  glucagon  Injectable 1 milliGRAM(s) IntraMuscular once  insulin glargine Injectable (LANTUS) 12 Unit(s) SubCutaneous at bedtime  insulin lispro (ADMELOG) corrective regimen sliding scale   SubCutaneous three times a day before meals  insulin lispro (ADMELOG) corrective regimen sliding scale   SubCutaneous at bedtime  insulin lispro Injectable (ADMELOG) 3 Unit(s) SubCutaneous three times a day before meals  lactobacillus acidophilus 1 Tablet(s) Oral daily  mupirocin 2% Ointment 1 Application(s) Both Nostrils two times a day  sodium chloride 0.9% lock flush 3 milliLiter(s) IV Push every 8 hours    MEDICATIONS  (PRN):  acetaminophen     Tablet .. 650 milliGRAM(s) Oral every 6 hours PRN Temp greater or equal to 38C (100.4F), Mild Pain (1 - 3), Moderate Pain (4 - 6)  dextrose Oral Gel 15 Gram(s) Oral once PRN Blood Glucose LESS THAN 70 milliGRAM(s)/deciliter  ondansetron Injectable 4 milliGRAM(s) IV Push every 8 hours PRN Nausea and/or Vomiting      CAPILLARY BLOOD GLUCOSE      POCT Blood Glucose.: 181 mg/dL (29 May 2022 21:52)  POCT Blood Glucose.: 157 mg/dL (29 May 2022 17:51)  POCT Blood Glucose.: 184 mg/dL (29 May 2022 12:24)  POCT Blood Glucose.: 149 mg/dL (29 May 2022 08:49)    I&O's Summary      PHYSICAL EXAM:  Vital Signs Last 24 Hrs  T(C): 36.8 (30 May 2022 06:04), Max: 36.8 (29 May 2022 13:09)  T(F): 98.2 (30 May 2022 06:04), Max: 98.2 (29 May 2022 13:09)  HR: 63 (30 May 2022 06:04) (63 - 71)  BP: 125/69 (30 May 2022 06:04) (125/69 - 156/62)  RR: 18 (30 May 2022 06:04) (18 - 18)  SpO2: 100% (30 May 2022 06:04) (100% - 100%)    GENERAL: No acute distress  EYES: EOMI, PERRLA; conjunctiva and sclera clear  ENMT: Moist oral mucosa  NECK: Supple, no palpable masses  RESPIRATORY: Lungs clear to ascultation b/l; unlabored respirations  CARDIOVASCULAR: Regular rate and rhythm, normal S1 and S2, no murmurs/rubs/gallops  ABDOMEN: Soft, normal bowel sounds, nondistended; +abdominal and CVA tenderness  MUSCULOSKELETAL: No joint swelling or tenderness to palpation  PSYCH: Affect appropriate  NEUROLOGY: AAOx3, CNs grossly intact  SKIN: No rashes; no palpable lesions    LABS:                        10.0   5.71  )-----------( 127      ( 29 May 2022 07:00 )             30.9     05-29    138  |  102  |  7   ----------------------------<  147<H>  3.8   |  26  |  0.69    Ca    8.9      29 May 2022 07:00  Phos  2.7     05-29  Mg     1.90     05-29                  INTERVAL RADIOLOGY/IMAGING STUDIES:

## 2022-05-31 ENCOUNTER — TRANSCRIPTION ENCOUNTER (OUTPATIENT)
Age: 62
End: 2022-05-31

## 2022-05-31 VITALS
RESPIRATION RATE: 17 BRPM | TEMPERATURE: 98 F | OXYGEN SATURATION: 99 % | DIASTOLIC BLOOD PRESSURE: 60 MMHG | SYSTOLIC BLOOD PRESSURE: 121 MMHG | HEART RATE: 78 BPM

## 2022-05-31 LAB
ANION GAP SERPL CALC-SCNC: 10 MMOL/L — SIGNIFICANT CHANGE UP (ref 7–14)
BUN SERPL-MCNC: 7 MG/DL — SIGNIFICANT CHANGE UP (ref 7–23)
CALCIUM SERPL-MCNC: 9.1 MG/DL — SIGNIFICANT CHANGE UP (ref 8.4–10.5)
CHLORIDE SERPL-SCNC: 102 MMOL/L — SIGNIFICANT CHANGE UP (ref 98–107)
CO2 SERPL-SCNC: 25 MMOL/L — SIGNIFICANT CHANGE UP (ref 22–31)
CREAT SERPL-MCNC: 0.66 MG/DL — SIGNIFICANT CHANGE UP (ref 0.5–1.3)
CULTURE RESULTS: SIGNIFICANT CHANGE UP
CULTURE RESULTS: SIGNIFICANT CHANGE UP
EGFR: 100 ML/MIN/1.73M2 — SIGNIFICANT CHANGE UP
GLUCOSE SERPL-MCNC: 152 MG/DL — HIGH (ref 70–99)
HCT VFR BLD CALC: 31.5 % — LOW (ref 34.5–45)
HGB BLD-MCNC: 10 G/DL — LOW (ref 11.5–15.5)
MAGNESIUM SERPL-MCNC: 1.8 MG/DL — SIGNIFICANT CHANGE UP (ref 1.6–2.6)
MCHC RBC-ENTMCNC: 27.1 PG — SIGNIFICANT CHANGE UP (ref 27–34)
MCHC RBC-ENTMCNC: 31.7 GM/DL — LOW (ref 32–36)
MCV RBC AUTO: 85.4 FL — SIGNIFICANT CHANGE UP (ref 80–100)
NRBC # BLD: 0 /100 WBCS — SIGNIFICANT CHANGE UP
NRBC # FLD: 0 K/UL — SIGNIFICANT CHANGE UP
PHOSPHATE SERPL-MCNC: 3.4 MG/DL — SIGNIFICANT CHANGE UP (ref 2.5–4.5)
PLATELET # BLD AUTO: 163 K/UL — SIGNIFICANT CHANGE UP (ref 150–400)
POTASSIUM SERPL-MCNC: 3.9 MMOL/L — SIGNIFICANT CHANGE UP (ref 3.5–5.3)
POTASSIUM SERPL-SCNC: 3.9 MMOL/L — SIGNIFICANT CHANGE UP (ref 3.5–5.3)
RBC # BLD: 3.69 M/UL — LOW (ref 3.8–5.2)
RBC # FLD: 15.2 % — HIGH (ref 10.3–14.5)
SARS-COV-2 RNA SPEC QL NAA+PROBE: SIGNIFICANT CHANGE UP
SODIUM SERPL-SCNC: 137 MMOL/L — SIGNIFICANT CHANGE UP (ref 135–145)
SPECIMEN SOURCE: SIGNIFICANT CHANGE UP
SPECIMEN SOURCE: SIGNIFICANT CHANGE UP
WBC # BLD: 5.42 K/UL — SIGNIFICANT CHANGE UP (ref 3.8–10.5)
WBC # FLD AUTO: 5.42 K/UL — SIGNIFICANT CHANGE UP (ref 3.8–10.5)

## 2022-05-31 PROCEDURE — 99232 SBSQ HOSP IP/OBS MODERATE 35: CPT

## 2022-05-31 PROCEDURE — 99239 HOSP IP/OBS DSCHRG MGMT >30: CPT

## 2022-05-31 RX ORDER — CEPHALEXIN 500 MG
1 CAPSULE ORAL
Qty: 20 | Refills: 0
Start: 2022-05-31 | End: 2022-06-04

## 2022-05-31 RX ORDER — GLIMEPIRIDE 1 MG
1 TABLET ORAL
Qty: 0 | Refills: 0 | DISCHARGE

## 2022-05-31 RX ORDER — INSULIN LISPRO 100/ML
10 VIAL (ML) SUBCUTANEOUS
Qty: 0 | Refills: 0 | DISCHARGE

## 2022-05-31 RX ORDER — PIOGLITAZONE HYDROCHLORIDE 15 MG/1
1 TABLET ORAL
Qty: 0 | Refills: 0 | DISCHARGE

## 2022-05-31 RX ORDER — SEMAGLUTIDE 0.68 MG/ML
1 INJECTION, SOLUTION SUBCUTANEOUS
Qty: 30 | Refills: 0
Start: 2022-05-31 | End: 2022-06-29

## 2022-05-31 RX ADMIN — Medication 650 MILLIGRAM(S): at 18:53

## 2022-05-31 RX ADMIN — Medication 650 MILLIGRAM(S): at 11:30

## 2022-05-31 RX ADMIN — Medication 3 UNIT(S): at 12:40

## 2022-05-31 RX ADMIN — Medication 1 TABLET(S): at 11:30

## 2022-05-31 RX ADMIN — Medication 3 UNIT(S): at 09:11

## 2022-05-31 RX ADMIN — Medication 3: at 18:06

## 2022-05-31 RX ADMIN — Medication 650 MILLIGRAM(S): at 12:30

## 2022-05-31 RX ADMIN — Medication 1: at 09:11

## 2022-05-31 RX ADMIN — Medication 3 UNIT(S): at 18:06

## 2022-05-31 RX ADMIN — CEFTRIAXONE 100 MILLIGRAM(S): 500 INJECTION, POWDER, FOR SOLUTION INTRAMUSCULAR; INTRAVENOUS at 18:06

## 2022-05-31 RX ADMIN — Medication 2: at 12:40

## 2022-05-31 NOTE — DISCHARGE NOTE NURSING/CASE MANAGEMENT/SOCIAL WORK - PATIENT PORTAL LINK FT
You can access the FollowMyHealth Patient Portal offered by [facility] by registering at the following website: [email or web address]. By joining [facility]’s FollowMyHealth portal, you will also be able to view your health information using other applications (apps) compatible with our system.

## 2022-05-31 NOTE — PROGRESS NOTE ADULT - ATTENDING COMMENTS
63 yo F w/ hx prior UTI ( resistant Ecoli; unable to find Ucx hx of PCN allergy) p/w b/l flank pain and fever and CT A/P with concern for b/l pyelonephritis. UA+ UCx- E coli sen ceftriaxone. Bcx- Ecoli repeat BCx NGTD; still with suprapubic tenderness     Severe Sepsis w/o septic shock secondary to pyelonephritis w/ bacteremia-  on ceftriaxone has tolerated without issue transition to PO total 10 days as per ID recs. Keflex                                       - check PVR as still with suprapubic tenderness  DM- A1c-9.3; hold oral hypoglycemics; repeat S/S regular with thin; insulin regimen as per endo  Dizziness- orthostatic neg; resolved   thrombocytopenia- likely in setting of sepsis; resolved     Dispo: PT recommend rehab discharge planning 61 yo F w/ hx prior UTI ( resistant Ecoli; unable to find Ucx hx of PCN allergy) p/w b/l flank pain and fever and CT A/P with concern for b/l pyelonephritis. UA+ UCx- E coli sen ceftriaxone. Bcx- Ecoli repeat BCx NGTD; still with suprapubic tenderness     Severe Sepsis w/o septic shock secondary to pyelonephritis w/ bacteremia-  on ceftriaxone has tolerated without issue transition to PO total 10 days as per ID recs. Keflex                                       - check PVR as still with suprapubic tenderness  DM poorly controlled- A1c-9.3; hold oral hypoglycemics; repeat S/S regular with thin; FS slightly above goal; insulin regimen as per endo  Dizziness- orthostatic neg; resolved   thrombocytopenia- likely in setting of sepsis; resolved   Hypomagnesemia unclear etiology- s/p repletion now resolved     Dispo: PT recommend rehab discharge planning

## 2022-05-31 NOTE — PROGRESS NOTE ADULT - ASSESSMENT
61 yo woman with diabetes, h/o UTI presented 5/24 with chills, nausea, flank pain found to have leucocytosis, pyuria, E coli in blood and CT evidence pyelonephritis    h/o allergy to penicillin (nausea)    Pyelonephritis, E coli sepsis/ bacteremia, leucocytosis  thrombocytopenia due to sepsis resolved  Improved    Suggest:     can transition to keflex 500 mg po q 6 h to continue through 6/4 (total 10 days tx)

## 2022-05-31 NOTE — PROGRESS NOTE ADULT - PROBLEM SELECTOR PLAN 1
Patient meets sepsis criteria due to fever and leukocytosis.  Sepsis 2/2 E Coli bacteremia and UTI  Blood cx 5/24 positive for E Ecoli, urine culture with E. coli   s/p 3L IVF   - per ID switched to ceftriaxone 5/27 2g QD, will continue for 10 day course Patient meets sepsis criteria due to fever and leukocytosis.  Sepsis 2/2 E Coli bacteremia and UTI  Blood cx 5/24 positive for E Ecoli, urine culture with E. coli   s/p 3L IVF   - per ID switched to ceftriaxone 5/27 2g QD, will continue for 10 day course until 6/4

## 2022-05-31 NOTE — PROGRESS NOTE ADULT - SUBJECTIVE AND OBJECTIVE BOX
--------------------------------------------------------------  Tha Kowalski MD  PGY-2, Internal Medicine  Pager #: [phone], [phone]  After 7 PM: Night Float Pager  --------------------------------------------------------------    Patient is a 62y old  Female who presents with a chief complaint of Pyelonephritis (30 May 2022 08:03)    OVERNIGHT / INTERVAL EVENTS:    SUBJECTIVE: Patient seen and examined bedside.     Denies fevers/chills, headaches/dizziness, nausea/vomiting, chest pain, SOB, abdominal pain.     MEDICATIONS  (STANDING):  cefTRIAXone   IVPB 2000 milliGRAM(s) IV Intermittent every 24 hours  dextrose 5%. 1000 milliLiter(s) (50 mL/Hr) IV Continuous <Continuous>  dextrose 5%. 1000 milliLiter(s) (100 mL/Hr) IV Continuous <Continuous>  dextrose 50% Injectable 25 Gram(s) IV Push once  dextrose 50% Injectable 12.5 Gram(s) IV Push once  dextrose 50% Injectable 25 Gram(s) IV Push once  enoxaparin Injectable 40 milliGRAM(s) SubCutaneous every 24 hours  glucagon  Injectable 1 milliGRAM(s) IntraMuscular once  insulin glargine Injectable (LANTUS) 12 Unit(s) SubCutaneous at bedtime  insulin lispro (ADMELOG) corrective regimen sliding scale   SubCutaneous three times a day before meals  insulin lispro (ADMELOG) corrective regimen sliding scale   SubCutaneous at bedtime  insulin lispro Injectable (ADMELOG) 3 Unit(s) SubCutaneous three times a day before meals  lactobacillus acidophilus 1 Tablet(s) Oral daily    MEDICATIONS  (PRN):  acetaminophen     Tablet .. 650 milliGRAM(s) Oral every 6 hours PRN Temp greater or equal to 38C (100.4F), Mild Pain (1 - 3), Moderate Pain (4 - 6)  dextrose Oral Gel 15 Gram(s) Oral once PRN Blood Glucose LESS THAN 70 milliGRAM(s)/deciliter  ondansetron Injectable 4 milliGRAM(s) IV Push every 8 hours PRN Nausea and/or Vomiting      CAPILLARY BLOOD GLUCOSE      POCT Blood Glucose.: 174 mg/dL (30 May 2022 22:17)  POCT Blood Glucose.: 210 mg/dL (30 May 2022 17:57)  POCT Blood Glucose.: 231 mg/dL (30 May 2022 12:08)  POCT Blood Glucose.: 136 mg/dL (30 May 2022 09:12)    I&O's Summary    30 May 2022 07:01  -  31 May 2022 07:00  --------------------------------------------------------  IN: 240 mL / OUT: 0 mL / NET: 240 mL        PHYSICAL EXAM:  Vital Signs Last 24 Hrs  T(C): 36.6 (31 May 2022 05:30), Max: 36.8 (30 May 2022 12:38)  T(F): 97.9 (31 May 2022 05:30), Max: 98.2 (30 May 2022 12:38)  HR: 69 (31 May 2022 05:30) (66 - 70)  BP: 135/65 (31 May 2022 05:30) (134/63 - 141/61)  BP(mean): --  RR: 17 (31 May 2022 05:30) (17 - 18)  SpO2: 100% (31 May 2022 05:30) (100% - 100%)    GENERAL: No acute distress  EYES: EOMI, PERRLA; conjunctiva and sclera clear  ENMT: Moist oral mucosa  NECK: Supple, no palpable masses  RESPIRATORY: Lungs clear to ascultation b/l; unlabored respirations  CARDIOVASCULAR: Regular rate and rhythm, normal S1 and S2, no murmurs/rubs/gallops  ABDOMEN: Soft, normal bowel sounds, nondistended, nontender  MUSCULOSKELETAL: No joint swelling or tenderness to palpation  PSYCH: Affect appropriate  NEUROLOGY: AAOx3, CNs grossly intact  SKIN: No rashes; no palpable lesions    LABS:                        9.8    5.83  )-----------( 159      ( 30 May 2022 06:50 )             30.6     05-30    136  |  100  |  7   ----------------------------<  155<H>  3.7   |  28  |  0.66    Ca    8.8      30 May 2022 06:50  Phos  3.4     05-30  Mg     1.90     05-30                  INTERVAL RADIOLOGY/IMAGING STUDIES:     --------------------------------------------------------------  Tha Kowalski MD  PGY-2, Internal Medicine  Pager #: [phone], [phone]  After 7 PM: Night Float Pager  --------------------------------------------------------------    Patient is a 62y old  Female who presents with a chief complaint of Pyelonephritis (30 May 2022 08:03)    OVERNIGHT / INTERVAL EVENTS: No acute overnight events.     SUBJECTIVE: Patient seen and examined bedside. Pt without acute new complaints. Still with abdominal tenderness as well as sensation of fevers though improved from prior days.     Denies headaches/dizziness, nausea/vomiting, chest pain, SOB.    MEDICATIONS  (STANDING):  cefTRIAXone   IVPB 2000 milliGRAM(s) IV Intermittent every 24 hours  dextrose 5%. 1000 milliLiter(s) (50 mL/Hr) IV Continuous <Continuous>  dextrose 5%. 1000 milliLiter(s) (100 mL/Hr) IV Continuous <Continuous>  dextrose 50% Injectable 25 Gram(s) IV Push once  dextrose 50% Injectable 12.5 Gram(s) IV Push once  dextrose 50% Injectable 25 Gram(s) IV Push once  enoxaparin Injectable 40 milliGRAM(s) SubCutaneous every 24 hours  glucagon  Injectable 1 milliGRAM(s) IntraMuscular once  insulin glargine Injectable (LANTUS) 12 Unit(s) SubCutaneous at bedtime  insulin lispro (ADMELOG) corrective regimen sliding scale   SubCutaneous three times a day before meals  insulin lispro (ADMELOG) corrective regimen sliding scale   SubCutaneous at bedtime  insulin lispro Injectable (ADMELOG) 3 Unit(s) SubCutaneous three times a day before meals  lactobacillus acidophilus 1 Tablet(s) Oral daily    MEDICATIONS  (PRN):  acetaminophen     Tablet .. 650 milliGRAM(s) Oral every 6 hours PRN Temp greater or equal to 38C (100.4F), Mild Pain (1 - 3), Moderate Pain (4 - 6)  dextrose Oral Gel 15 Gram(s) Oral once PRN Blood Glucose LESS THAN 70 milliGRAM(s)/deciliter  ondansetron Injectable 4 milliGRAM(s) IV Push every 8 hours PRN Nausea and/or Vomiting      CAPILLARY BLOOD GLUCOSE      POCT Blood Glucose.: 174 mg/dL (30 May 2022 22:17)  POCT Blood Glucose.: 210 mg/dL (30 May 2022 17:57)  POCT Blood Glucose.: 231 mg/dL (30 May 2022 12:08)  POCT Blood Glucose.: 136 mg/dL (30 May 2022 09:12)    I&O's Summary    30 May 2022 07:01  -  31 May 2022 07:00  --------------------------------------------------------  IN: 240 mL / OUT: 0 mL / NET: 240 mL        PHYSICAL EXAM:  Vital Signs Last 24 Hrs  T(C): 36.6 (31 May 2022 05:30), Max: 36.8 (30 May 2022 12:38)  T(F): 97.9 (31 May 2022 05:30), Max: 98.2 (30 May 2022 12:38)  HR: 69 (31 May 2022 05:30) (66 - 70)  BP: 135/65 (31 May 2022 05:30) (134/63 - 141/61)  RR: 17 (31 May 2022 05:30) (17 - 18)  SpO2: 100% (31 May 2022 05:30) (100% - 100%)    GENERAL: No acute distress  EYES: EOMI, PERRLA; conjunctiva and sclera clear  ENMT: Moist oral mucosa  NECK: Supple, no palpable masses  RESPIRATORY: Lungs clear to ascultation b/l; unlabored respirations  CARDIOVASCULAR: Regular rate and rhythm, normal S1 and S2, no murmurs/rubs/gallops  ABDOMEN: Soft, normal bowel sounds, nondistended; +abdominal and CVA tenderness  MUSCULOSKELETAL: No joint swelling or tenderness to palpation  PSYCH: Affect appropriate  NEUROLOGY: AAOx3, CNs grossly intact  SKIN: No rashes; no palpable lesions    LABS:                        9.8    5.83  )-----------( 159      ( 30 May 2022 06:50 )             30.6     05-30    136  |  100  |  7   ----------------------------<  155<H>  3.7   |  28  |  0.66    Ca    8.8      30 May 2022 06:50  Phos  3.4     05-30  Mg     1.90     05-30                  INTERVAL RADIOLOGY/IMAGING STUDIES:

## 2022-05-31 NOTE — PROGRESS NOTE ADULT - ASSESSMENT
62M w/ PMH of T2DM on Metformin, HTN on Ramipril presents to the ED complaining of bilateral flank and lower abdominal pain admitted for pyelonephritis.

## 2022-05-31 NOTE — PROGRESS NOTE ADULT - REASON FOR ADMISSION
Pyelonephritis

## 2022-05-31 NOTE — DISCHARGE NOTE NURSING/CASE MANAGEMENT/SOCIAL WORK - NSDCPEFALRISK_GEN_ALL_CORE
For information on Fall & Injury Prevention, visit: [email or web address] OR  [email or web address] OR  https://www.cdc.gov/steadi/patient.html

## 2022-05-31 NOTE — PROGRESS NOTE ADULT - PROVIDER SPECIALTY LIST ADULT
Endocrinology
Infectious Disease
Endocrinology
Infectious Disease
Internal Medicine

## 2022-05-31 NOTE — CHART NOTE - NSCHARTNOTEFT_GEN_A_CORE
Recommend c/w Lantus 12 Units HS and increase Admelog to 4 units TIDAC plus low admelog scale before meals and bedtime. Endocrine team will follow.    Maritza Crawford DO

## 2022-05-31 NOTE — PROGRESS NOTE ADULT - SUBJECTIVE AND OBJECTIVE BOX
Follow Up:  E coli sepsis, pyelo    Interval History/ROS:  feels well       no fever, chills    tolerated ceftriaxone    Allergies  codeine (Nausea)  penicillin (Nausea)        ANTIMICROBIALS: cefTRIAXone   IVPB 2000 every 24 hours    MEDICATIONS  (STANDING):  acetaminophen     Tablet .. 650 every 6 hours PRN  dextrose 50% Injectable 25 once  dextrose 50% Injectable 12.5 once  dextrose 50% Injectable 25 once  dextrose Oral Gel 15 once PRN  enoxaparin Injectable 40 every 24 hours  glucagon  Injectable 1 once  insulin glargine Injectable (LANTUS) 12 at bedtime  insulin lispro (ADMELOG) corrective regimen sliding scale  three times a day before meals  insulin lispro (ADMELOG) corrective regimen sliding scale  at bedtime  insulin lispro Injectable (ADMELOG) 3 three times a day before meals  ondansetron Injectable 4 every 8 hours PRN    Vital Signs Last 24 Hrs  T(F): 98.2 (05-31-22 @ 14:00), Max: 98.2 (05-31-22 @ 14:00)  HR: 78 (05-31-22 @ 14:00)  BP: 121/60 (05-31-22 @ 14:00)  RR: 17 (05-31-22 @ 14:00)  SpO2: 99% (05-31-22 @ 14:00) (99% - 100%)    PHYSICAL EXAM:  Constitutional: Not in acute distress  Eyes: No icterus.  Oral cavity: Clear, no lesions  Neck: Supple  RS: Chest clear to auscultation bilaterally. No wheeze/rhonchi/crepitations.  CVS: S1, S2 heard. Regular rate and rhythm. No murmurs/rubs/gallops.  Abdomen: Soft. No guarding/rigidity/tenderness.  : minimal suprapubic tenderness  Extremities: Warm. No pedal edema  Skin: No lesions noted  Vascular: No evidence of phlebitis  Neuro: Alert, oriented to time/place/person  Cranial nerves 2-12 grossly normal. No focal abnormalities                                     10.0   5.42  )-----------( 163      ( 31 May 2022 05:45 )             31.5 05-31    137  |  102  |  7   ----------------------------<  152  3.9   |  25  |  0.66  Ca    9.1      31 May 2022 05:45Phos  3.4     05-31Mg     1.80     05-31            MICROBIOLOGY:    culCulture - Blood in AM (05.26.22 @ 05:10)   Specimen Source: .Blood Blood-Venous   Culture Results:   No Growth Final Culture - Blood in AM (05.26.22 @ 05:00)   Specimen Source: .Blood Blood-Peripheral   Culture Results:   No Growth Final     .Blood Blood-Peripheral  05-24-22   Growth in aerobic bottle: Escherichia coli  See previous culture [id number]  Growth in anaerobic bottle: Gram Negative Rods  --  Blood Culture PCR  Escherichia coli      Clean Catch Clean Catch (Midstream)  05-24-22   >100,000 CFU/ml Escherichia coli  --  --          Rapid RVP Result: NotDetec (05-24 @ 16:05)        RADIOLOGY:

## 2022-08-05 ENCOUNTER — INPATIENT (INPATIENT)
Facility: HOSPITAL | Age: 62
LOS: 3 days | Discharge: HOME | End: 2022-08-09
Attending: FAMILY MEDICINE | Admitting: FAMILY MEDICINE

## 2022-08-05 VITALS
DIASTOLIC BLOOD PRESSURE: 72 MMHG | SYSTOLIC BLOOD PRESSURE: 109 MMHG | OXYGEN SATURATION: 97 % | WEIGHT: 119.93 LBS | RESPIRATION RATE: 17 BRPM | HEART RATE: 78 BPM | TEMPERATURE: 98 F

## 2022-08-05 DIAGNOSIS — Z90.49 ACQUIRED ABSENCE OF OTHER SPECIFIED PARTS OF DIGESTIVE TRACT: Chronic | ICD-10-CM

## 2022-08-05 DIAGNOSIS — I10 ESSENTIAL (PRIMARY) HYPERTENSION: ICD-10-CM

## 2022-08-05 DIAGNOSIS — N12 TUBULO-INTERSTITIAL NEPHRITIS, NOT SPECIFIED AS ACUTE OR CHRONIC: ICD-10-CM

## 2022-08-05 DIAGNOSIS — E11.9 TYPE 2 DIABETES MELLITUS WITHOUT COMPLICATIONS: ICD-10-CM

## 2022-08-05 LAB
ALBUMIN SERPL ELPH-MCNC: 2.3 G/DL — LOW (ref 3.3–5)
ALP SERPL-CCNC: 136 U/L — HIGH (ref 40–120)
ALT FLD-CCNC: 39 U/L — SIGNIFICANT CHANGE UP (ref 12–78)
ANION GAP SERPL CALC-SCNC: 8 MMOL/L — SIGNIFICANT CHANGE UP (ref 5–17)
APPEARANCE UR: CLEAR — SIGNIFICANT CHANGE UP
AST SERPL-CCNC: 34 U/L — SIGNIFICANT CHANGE UP (ref 15–37)
BACTERIA # UR AUTO: ABNORMAL
BASOPHILS # BLD AUTO: 0.02 K/UL — SIGNIFICANT CHANGE UP (ref 0–0.2)
BASOPHILS NFR BLD AUTO: 0.2 % — SIGNIFICANT CHANGE UP (ref 0–2)
BILIRUB SERPL-MCNC: 0.4 MG/DL — SIGNIFICANT CHANGE UP (ref 0.2–1.2)
BILIRUB UR-MCNC: NEGATIVE — SIGNIFICANT CHANGE UP
BUN SERPL-MCNC: 17 MG/DL — SIGNIFICANT CHANGE UP (ref 7–23)
CALCIUM SERPL-MCNC: 8.8 MG/DL — SIGNIFICANT CHANGE UP (ref 8.5–10.1)
CHLORIDE SERPL-SCNC: 97 MMOL/L — SIGNIFICANT CHANGE UP (ref 96–108)
CO2 SERPL-SCNC: 28 MMOL/L — SIGNIFICANT CHANGE UP (ref 22–31)
COLOR SPEC: YELLOW — SIGNIFICANT CHANGE UP
CREAT SERPL-MCNC: 1.2 MG/DL — SIGNIFICANT CHANGE UP (ref 0.5–1.3)
DIFF PNL FLD: ABNORMAL
EGFR: 52 ML/MIN/1.73M2 — LOW
EOSINOPHIL # BLD AUTO: 0.09 K/UL — SIGNIFICANT CHANGE UP (ref 0–0.5)
EOSINOPHIL NFR BLD AUTO: 1 % — SIGNIFICANT CHANGE UP (ref 0–6)
EPI CELLS # UR: ABNORMAL
FLUAV AG NPH QL: SIGNIFICANT CHANGE UP
FLUBV AG NPH QL: SIGNIFICANT CHANGE UP
GLUCOSE BLDC GLUCOMTR-MCNC: 238 MG/DL — HIGH (ref 70–99)
GLUCOSE BLDC GLUCOMTR-MCNC: 349 MG/DL — HIGH (ref 70–99)
GLUCOSE SERPL-MCNC: 352 MG/DL — HIGH (ref 70–99)
GLUCOSE UR QL: 250 MG/DL
HCT VFR BLD CALC: 30.7 % — LOW (ref 34.5–45)
HGB BLD-MCNC: 10 G/DL — LOW (ref 11.5–15.5)
IMM GRANULOCYTES NFR BLD AUTO: 0.3 % — SIGNIFICANT CHANGE UP (ref 0–1.5)
KETONES UR-MCNC: NEGATIVE — SIGNIFICANT CHANGE UP
LACTATE SERPL-SCNC: 1.7 MMOL/L — SIGNIFICANT CHANGE UP (ref 0.7–2)
LEUKOCYTE ESTERASE UR-ACNC: ABNORMAL
LIDOCAIN IGE QN: 170 U/L — SIGNIFICANT CHANGE UP (ref 73–393)
LYMPHOCYTES # BLD AUTO: 2.01 K/UL — SIGNIFICANT CHANGE UP (ref 1–3.3)
LYMPHOCYTES # BLD AUTO: 22.9 % — SIGNIFICANT CHANGE UP (ref 13–44)
MCHC RBC-ENTMCNC: 26.7 PG — LOW (ref 27–34)
MCHC RBC-ENTMCNC: 32.6 G/DL — SIGNIFICANT CHANGE UP (ref 32–36)
MCV RBC AUTO: 81.9 FL — SIGNIFICANT CHANGE UP (ref 80–100)
MONOCYTES # BLD AUTO: 0.65 K/UL — SIGNIFICANT CHANGE UP (ref 0–0.9)
MONOCYTES NFR BLD AUTO: 7.4 % — SIGNIFICANT CHANGE UP (ref 2–14)
NEUTROPHILS # BLD AUTO: 5.98 K/UL — SIGNIFICANT CHANGE UP (ref 1.8–7.4)
NEUTROPHILS NFR BLD AUTO: 68.2 % — SIGNIFICANT CHANGE UP (ref 43–77)
NITRITE UR-MCNC: POSITIVE
NRBC # BLD: 0 /100 WBCS — SIGNIFICANT CHANGE UP (ref 0–0)
PH UR: 6 — SIGNIFICANT CHANGE UP (ref 5–8)
PLATELET # BLD AUTO: 165 K/UL — SIGNIFICANT CHANGE UP (ref 150–400)
POTASSIUM SERPL-MCNC: 4.1 MMOL/L — SIGNIFICANT CHANGE UP (ref 3.5–5.3)
POTASSIUM SERPL-SCNC: 4.1 MMOL/L — SIGNIFICANT CHANGE UP (ref 3.5–5.3)
PROT SERPL-MCNC: 8.3 GM/DL — SIGNIFICANT CHANGE UP (ref 6–8.3)
PROT UR-MCNC: 15 MG/DL
RBC # BLD: 3.75 M/UL — LOW (ref 3.8–5.2)
RBC # FLD: 13.7 % — SIGNIFICANT CHANGE UP (ref 10.3–14.5)
RBC CASTS # UR COMP ASSIST: ABNORMAL /HPF (ref 0–4)
SARS-COV-2 RNA SPEC QL NAA+PROBE: SIGNIFICANT CHANGE UP
SODIUM SERPL-SCNC: 133 MMOL/L — LOW (ref 135–145)
SP GR SPEC: 1.01 — SIGNIFICANT CHANGE UP (ref 1.01–1.02)
UROBILINOGEN FLD QL: NEGATIVE MG/DL — SIGNIFICANT CHANGE UP
WBC # BLD: 8.78 K/UL — SIGNIFICANT CHANGE UP (ref 3.8–10.5)
WBC # FLD AUTO: 8.78 K/UL — SIGNIFICANT CHANGE UP (ref 3.8–10.5)
WBC UR QL: ABNORMAL

## 2022-08-05 PROCEDURE — 71045 X-RAY EXAM CHEST 1 VIEW: CPT | Mod: 26

## 2022-08-05 PROCEDURE — 99223 1ST HOSP IP/OBS HIGH 75: CPT

## 2022-08-05 PROCEDURE — 74177 CT ABD & PELVIS W/CONTRAST: CPT | Mod: 26,MA

## 2022-08-05 PROCEDURE — 93010 ELECTROCARDIOGRAM REPORT: CPT

## 2022-08-05 PROCEDURE — 99285 EMERGENCY DEPT VISIT HI MDM: CPT

## 2022-08-05 RX ORDER — NALOXONE HYDROCHLORIDE 4 MG/.1ML
0.4 SPRAY NASAL ONCE
Refills: 0 | Status: DISCONTINUED | OUTPATIENT
Start: 2022-08-05 | End: 2022-08-09

## 2022-08-05 RX ORDER — INSULIN GLARGINE 100 [IU]/ML
8 INJECTION, SOLUTION SUBCUTANEOUS AT BEDTIME
Refills: 0 | Status: DISCONTINUED | OUTPATIENT
Start: 2022-08-05 | End: 2022-08-09

## 2022-08-05 RX ORDER — ACETAMINOPHEN 500 MG
650 TABLET ORAL ONCE
Refills: 0 | Status: COMPLETED | OUTPATIENT
Start: 2022-08-05 | End: 2022-08-05

## 2022-08-05 RX ORDER — DEXTROSE 50 % IN WATER 50 %
15 SYRINGE (ML) INTRAVENOUS ONCE
Refills: 0 | Status: DISCONTINUED | OUTPATIENT
Start: 2022-08-05 | End: 2022-08-09

## 2022-08-05 RX ORDER — INSULIN LISPRO 100/ML
VIAL (ML) SUBCUTANEOUS
Refills: 0 | Status: DISCONTINUED | OUTPATIENT
Start: 2022-08-05 | End: 2022-08-09

## 2022-08-05 RX ORDER — GENTAMICIN SULFATE 40 MG/ML
160 VIAL (ML) INJECTION ONCE
Refills: 0 | Status: COMPLETED | OUTPATIENT
Start: 2022-08-05 | End: 2022-08-05

## 2022-08-05 RX ORDER — ONDANSETRON 8 MG/1
4 TABLET, FILM COATED ORAL ONCE
Refills: 0 | Status: COMPLETED | OUTPATIENT
Start: 2022-08-05 | End: 2022-08-05

## 2022-08-05 RX ORDER — MEROPENEM 1 G/30ML
500 INJECTION INTRAVENOUS ONCE
Refills: 0 | Status: COMPLETED | OUTPATIENT
Start: 2022-08-05 | End: 2022-08-05

## 2022-08-05 RX ORDER — SODIUM CHLORIDE 9 MG/ML
1000 INJECTION INTRAMUSCULAR; INTRAVENOUS; SUBCUTANEOUS ONCE
Refills: 0 | Status: COMPLETED | OUTPATIENT
Start: 2022-08-05 | End: 2022-08-05

## 2022-08-05 RX ORDER — INSULIN LISPRO 100/ML
VIAL (ML) SUBCUTANEOUS AT BEDTIME
Refills: 0 | Status: DISCONTINUED | OUTPATIENT
Start: 2022-08-05 | End: 2022-08-09

## 2022-08-05 RX ORDER — DEXTROSE 50 % IN WATER 50 %
12.5 SYRINGE (ML) INTRAVENOUS ONCE
Refills: 0 | Status: DISCONTINUED | OUTPATIENT
Start: 2022-08-05 | End: 2022-08-09

## 2022-08-05 RX ORDER — DEXTROSE 50 % IN WATER 50 %
25 SYRINGE (ML) INTRAVENOUS ONCE
Refills: 0 | Status: DISCONTINUED | OUTPATIENT
Start: 2022-08-05 | End: 2022-08-09

## 2022-08-05 RX ORDER — CEFTRIAXONE 500 MG/1
2000 INJECTION, POWDER, FOR SOLUTION INTRAMUSCULAR; INTRAVENOUS EVERY 24 HOURS
Refills: 0 | Status: DISCONTINUED | OUTPATIENT
Start: 2022-08-05 | End: 2022-08-09

## 2022-08-05 RX ORDER — POLYETHYLENE GLYCOL 3350 17 G/17G
17 POWDER, FOR SOLUTION ORAL DAILY
Refills: 0 | Status: DISCONTINUED | OUTPATIENT
Start: 2022-08-05 | End: 2022-08-07

## 2022-08-05 RX ORDER — IBUPROFEN 200 MG
400 TABLET ORAL EVERY 6 HOURS
Refills: 0 | Status: DISCONTINUED | OUTPATIENT
Start: 2022-08-05 | End: 2022-08-07

## 2022-08-05 RX ORDER — SENNA PLUS 8.6 MG/1
2 TABLET ORAL AT BEDTIME
Refills: 0 | Status: DISCONTINUED | OUTPATIENT
Start: 2022-08-05 | End: 2022-08-09

## 2022-08-05 RX ORDER — SODIUM CHLORIDE 9 MG/ML
1000 INJECTION, SOLUTION INTRAVENOUS
Refills: 0 | Status: DISCONTINUED | OUTPATIENT
Start: 2022-08-05 | End: 2022-08-09

## 2022-08-05 RX ORDER — GLUCAGON INJECTION, SOLUTION 0.5 MG/.1ML
1 INJECTION, SOLUTION SUBCUTANEOUS ONCE
Refills: 0 | Status: DISCONTINUED | OUTPATIENT
Start: 2022-08-05 | End: 2022-08-09

## 2022-08-05 RX ADMIN — SODIUM CHLORIDE 1000 MILLILITER(S): 9 INJECTION INTRAMUSCULAR; INTRAVENOUS; SUBCUTANEOUS at 14:32

## 2022-08-05 RX ADMIN — Medication 650 MILLIGRAM(S): at 13:33

## 2022-08-05 RX ADMIN — SODIUM CHLORIDE 1000 MILLILITER(S): 9 INJECTION INTRAMUSCULAR; INTRAVENOUS; SUBCUTANEOUS at 13:32

## 2022-08-05 RX ADMIN — MEROPENEM 100 MILLIGRAM(S): 1 INJECTION INTRAVENOUS at 15:57

## 2022-08-05 RX ADMIN — Medication 400 MILLIGRAM(S): at 18:19

## 2022-08-05 RX ADMIN — INSULIN GLARGINE 8 UNIT(S): 100 INJECTION, SOLUTION SUBCUTANEOUS at 22:01

## 2022-08-05 RX ADMIN — Medication 208 MILLIGRAM(S): at 17:59

## 2022-08-05 RX ADMIN — CEFTRIAXONE 100 MILLIGRAM(S): 500 INJECTION, POWDER, FOR SOLUTION INTRAMUSCULAR; INTRAVENOUS at 17:27

## 2022-08-05 RX ADMIN — Medication 400 MILLIGRAM(S): at 18:07

## 2022-08-05 RX ADMIN — MEROPENEM 500 MILLIGRAM(S): 1 INJECTION INTRAVENOUS at 16:30

## 2022-08-05 RX ADMIN — Medication 650 MILLIGRAM(S): at 18:18

## 2022-08-05 RX ADMIN — Medication 8: at 17:34

## 2022-08-05 NOTE — H&P ADULT - PROBLEM SELECTOR PLAN 1
- chart reviewed.  Pt had similar presentation at [facility] with pan sensitive E.Coli Bacteremia, she completed a course of Rocephin then Keflex (10 day treatment.)  In light of this will resume treatment with:  - Ceftriaxone 2GM daily and Gentamycin 160 mg IVPB (3mg/kg) for 1 dose   - will follow urine and blood cultures   - follow am BMP   - patient also has CODY present on admission due to pyelonephritis.   - tylenol for pain

## 2022-08-05 NOTE — H&P ADULT - NSHPPHYSICALEXAM_GEN_ALL_CORE
PHYSICAL EXAM:    T(C): 36.7 (08-05-22 @ 15:48), Max: 36.9 (08-05-22 @ 12:21)  HR: 75 (08-05-22 @ 15:48) (75 - 78)  BP: 133/84 (08-05-22 @ 15:48) (109/72 - 133/84)  RR: 16 (08-05-22 @ 15:48) (16 - 17)  SpO2: 99% (08-05-22 @ 15:48) (97% - 99%)  Wt(kg): --    Constitutional: thin and in mild distress due to pain     Eyes: PERRLA, EOMI     Neck: supple no JVD    Respiratory: Clear bilaterally, no wheezing, or rhonci    Cardiovascular: S1, S2 no murmurs, rubs or gallops    Gastrointestinal: soft, tender to palpation of bilateral flanks, no rebound tenderness    Extremities: non-tender, no edema    Neurological: AAO x 3 no focal deficits    Skin: no rashes    Musculoskeletal: 5/5 strength throughout, normal ROM    Psychiatric: appropriate affect

## 2022-08-05 NOTE — ED PROVIDER NOTE - NS ED ROS FT
CONSTITUTIONAL: No fever, no chills, no fatigue  EYES: No visual changes  ENT: No ear pain, no sore throat  CARDIOVASCULAR: No chest pain, no palpitations  RESPIRATORY: No cough, no SOB  GI:  no constipation, no diarrhea  GENITOURINARY: no frequency, no hematuria  MUSKULOSKELETAL: no joint pain, no myalgias  SKIN: No rash  NEURO: No headache    ALL OTHER SYSTEMS NEGATIVE.

## 2022-08-05 NOTE — ED ADULT TRIAGE NOTE - CHIEF COMPLAINT QUOTE
BIBA as per EMS patient c/o N/V, abdominal pain and elevated blood sugar. Pt diagnosed with ecoli 1 week prior, started on abx, unable to take her medications.

## 2022-08-05 NOTE — ED PROVIDER NOTE - PHYSICAL EXAMINATION
GEN: Awake, alert, interactive, NAD.  HEAD AND NECK: NC/AT. Airway patent. Neck supple.   EYES:  Clear b/l. EOMI. PERRL.   ENT: Moist mucus membranes.   CARDIAC: Regular rate, regular rhythm. No evident pedal edema.    RESP/CHEST: Normal respiratory effort with no use of accessory muscles or retractions. Clear throughout on auscultation.  ABD: soft, non-distended, lower abdominal tenderness,  No rebound, no guarding.   BACK: No midline spinal TTP. Mild bilateral CVA  EXTREMITIES: Moving all extremities with no apparent deformities.   SKIN: Warm, dry, intact normal color. No rash.   NEURO: AOx3, CN II-XII grossly intact, no focal deficits.   PSYCH: Appropriate mood and affect.

## 2022-08-05 NOTE — ED PROVIDER NOTE - CLINICAL SUMMARY MEDICAL DECISION MAKING FREE TEXT BOX
61 y/o female with DM, htn, high chol, history kidney infection presents with lower abdominal pain x few months getting worse with recent UTI.   Will  check labs, ua, and will get ct a/p r/o pyelo, IVF, zofran, tylenol ordered. 63 y/o female with DM, htn, high chol, history kidney infection presents with lower abdominal pain x few months getting worse with recent UTI.   Will  check labs, ua, and will get ct a/p r/o pyelo, IVF, zofran, tylenol ordered.  labs reviewed, no leukocytosis. UA positive for UTI. Reviewed pt's urine culture from May pt was sensitive to merepenum. Will give merpenum. Ct a/p shows bilateral pyelonephritis and cystitis.   Given pt failed PO abx will admit patient to the hospital for pyelonephritis. requiring iv abx.

## 2022-08-05 NOTE — ED PROVIDER NOTE - ATTENDING APP SHARED VISIT CONTRIBUTION OF CARE
I have personally seen and examined this pt I have fully participated in the care of this pt. I have made amendments to the documentation where appropriate and otherwise hx, exam and plan as documented by the ACP.

## 2022-08-05 NOTE — ED PROVIDER NOTE - OBJECTIVE STATEMENT
61 y/o female with DM, HTN, high chol, history kidney infection presentsn with lower abdominal pain since May on and off and getting worse. Pt states she was dx with a UTI/ kidney infection in May with ecoli in the urine and was treated with IV abx and po with some resolution. Pt had urine rechecked this saturday and reports having a UTI again and was placed on macrobid. Pt states she has been taking macrobid which made her feel sick. Reports having discomfort with urination, decrease appetite, vomiting about 3-4 times a day nonbloody. (+) lower abdominal pain radiating to the back and with fever 102 at home last few days. Denies hematuria, difficulty urinating, diarrhea, recent travel, sick contact.

## 2022-08-05 NOTE — ED ADULT NURSE NOTE - OBJECTIVE STATEMENT
Dx: Ecoli in urine on saturday and given rx: macrobid. pt c/o lower abdominal pain, fever. pt also dx uti in march and may.

## 2022-08-05 NOTE — ED PROVIDER NOTE - TEMPLATE
Outpatient Physical Therapy Ortho Treatment Note  [facility]     Patient Name: Arlin Hutson  : 1934  MRN: [id number]  Today's Date: 2018      Visit Date: 2018    Visit Dx:    ICD-10-CM ICD-9-CM   1. Chronic pain of right knee M25.561 719.46    G89.29 338.29   2. Impaired mobility Z74.09 799.89   3. Difficulty walking R26.2 719.7   4. Acute left-sided low back pain without sciatica M54.5 724.2       Patient Active Problem List   Diagnosis   • Gastroesophageal reflux disease   • Osteopenia of multiple sites   • Essential hypertension   • Colitis        Past Medical History:   Diagnosis Date   • Colon polyp    • CREST syndrome    • GERD (gastroesophageal reflux disease)    • History of CT scan of abdomen 2011    constipation, sig tics, 6 mm hepatic cyst   • History of rheumatoid arthritis    • Hyperlipidemia    • Hypertension    • Osteoarthritis    • Raynaud's disease    • Sjogren's syndrome         Past Surgical History:   Procedure Laterality Date   • CATARACT EXTRACTION     • COLONOSCOPY  2016    tics, microscopic colitis,    • COLONOSCOPY  2011    sig tics, inflammation, polyp   • DILATATION AND CURETTAGE     • FLEXIBLE SIGMOIDOSCOPY     • KNEE SURGERY Right    • UPPER GASTROINTESTINAL ENDOSCOPY  2008    erythema   • WRIST SURGERY Right                              PT Assessment/Plan     Row Name 18 1112          PT Assessment    Assessment Comments Arlin was instructed in initial aquatic exercise program for knee rehab related to OA/RA.  She does report falling last week while on vacation after getting out of the car and lost her balance-she reports no injury.  She is slightly unsteady in the pool, partly due to her height in relation to water depth.  no problems with today’s session. Will provide written instructions and pictures of aquatic exercise for next session as this is planned to be her final seasion.  -DANIEL       User Key  (r) = Recorded By,  (t) = Taken By, (c) = Cosigned By    Initials Name Provider Type    DANIEL Albrecht, PT Physical Therapist                    Exercises     Row Name 04/04/18 1000             Subjective Comments    Subjective Comments Went to [place] last week and fell after getting out of the car. Didn’t get hurt, landed on my right hip.  I don’t know what happened, I just lost my balance.  I’ have an appt next week at the [facility] for my hearing aid.  -DANIEL         Subjective Pain    Able to rate subjective pain? yes  -DANIEL      Pre-Treatment Pain Level 3  -DANIEL      Subjective Pain Comment Knee feels better in the mornings. Knee feels tight today.  The pain gets worse as the day goes on.  Knee also hurts more when I try to sleep.  -DANIEL         Aquatics    Aquatics performed? Yes  -DANIEL         Aquatics LE    Water Walk side;forward;backward   Holding rail, SBA 2 laps ea  -DANIEL      Stretch 1 Hamstrings 20 sec X 2 (no noodle aupport- bothered knee)  -DANIEL      Stretch 2 Calf 20 sec X 2 ea  -DANIEL      Stretch 3 Quads 20 sec X 2 ea  -DANIEL      Stretch Other 1 BKTC with noodle at rail 15 sec X 2  -DANIEL      Abdominals noodle   small noodle X 15  -DANIEL      Hip Abd/Add 15X ea  -DANIEL      Hip Flex/Ext 15X ea  -DANIEL      March in Place 10X  -DANIEL      Mini Squat 15X  -DANIEL      Toe/Heel Raises -/15X  -DANIEL      Uni-Squat Tandem walk 10 ft X 4  -DANIEL      Step Ups 6 inch X 10  -DANIEL      Bicycle bench X 1 min  -DANIEL        User Key  (r) = Recorded By, (t) = Taken By, (c) = Cosigned By    Initials Name Provider Type    DANIEL Albrecht, PT Physical Therapist                                            Time Calculation:   Start Time: 1030  Stop Time: 1112  Time Calculation (min): 42 min    Therapy Charges for Today     Code Description Service Date Service Provider Modifiers Qty    [id number]  PT AQUATIC THERAPY EA 15 MIN 4/4/2018 Gary Albrecht, PT GP 3                    Gary Albrecht, PT  4/4/2018      Abdominal Pain, N/V/D

## 2022-08-05 NOTE — H&P ADULT - HISTORY OF PRESENT ILLNESS
63 y/o female with PMHX of DM, recent hospitalization with ecoli bacteremia that presents with a 4 day history of progessively worsening fatigue, weakness, and elevated blood sugars.  She complains of anorexia as well.  She states that she tested positive for UTI at her gynecologist office and started on Macrobid which caused her to have nausea and vomit.  She states that over the last 48 hours she felt dizzy and disoriented.  She denies fevers but states that she did not know where she was.      ED course:    Patient found to be hyper 61 y/o female with PMHX of DM, recent hospitalization with ecoli bacteremia that presents with a 4 day history of progessively worsening fatigue, weakness, and elevated blood sugars.  She complains of anorexia as well.  She states that she tested positive for UTI at her gynecologist office and started on Macrobid which caused her to have nausea and vomit.  She states that over the last 48 hours she felt dizzy and disoriented.  She denies fevers but states that she did not know where she was.      ED course:    Patient found to be hyperglycemic, CT abdomen was positive for likely pyelonep 63 y/o female with PMHX of DM, recent hospitalization with ecoli bacteremia that presents with a 4 day history of progessively worsening fatigue, weakness, and elevated blood sugars.  She complains of anorexia as well.  She states that she tested positive for UTI at her gynecologist office and started on Macrobid which caused her to have nausea and vomit.  She states that over the last 48 hours she felt dizzy and disoriented.  She denies fevers but states that she did not know where she was.      ED course:    Patient found to be hyperglycemic, CT abdomen was positive for likely pyelonephritis

## 2022-08-05 NOTE — H&P ADULT - PROBLEM SELECTOR PLAN 3
- hold oral medications   - Lantus 8U QHS with plan to titrate as needed to keep fingersticks < 150   - HISS

## 2022-08-06 LAB
ANION GAP SERPL CALC-SCNC: 7 MMOL/L — SIGNIFICANT CHANGE UP (ref 5–17)
BASOPHILS # BLD AUTO: 0.01 K/UL — SIGNIFICANT CHANGE UP (ref 0–0.2)
BASOPHILS NFR BLD AUTO: 0.1 % — SIGNIFICANT CHANGE UP (ref 0–2)
BUN SERPL-MCNC: 13 MG/DL — SIGNIFICANT CHANGE UP (ref 7–23)
CALCIUM SERPL-MCNC: 8.6 MG/DL — SIGNIFICANT CHANGE UP (ref 8.5–10.1)
CHLORIDE SERPL-SCNC: 106 MMOL/L — SIGNIFICANT CHANGE UP (ref 96–108)
CO2 SERPL-SCNC: 26 MMOL/L — SIGNIFICANT CHANGE UP (ref 22–31)
CREAT SERPL-MCNC: 1.1 MG/DL — SIGNIFICANT CHANGE UP (ref 0.5–1.3)
EGFR: 57 ML/MIN/1.73M2 — LOW
EOSINOPHIL # BLD AUTO: 0.08 K/UL — SIGNIFICANT CHANGE UP (ref 0–0.5)
EOSINOPHIL NFR BLD AUTO: 1.1 % — SIGNIFICANT CHANGE UP (ref 0–6)
GLUCOSE BLDC GLUCOMTR-MCNC: 193 MG/DL — HIGH (ref 70–99)
GLUCOSE BLDC GLUCOMTR-MCNC: 219 MG/DL — HIGH (ref 70–99)
GLUCOSE BLDC GLUCOMTR-MCNC: 220 MG/DL — HIGH (ref 70–99)
GLUCOSE BLDC GLUCOMTR-MCNC: 240 MG/DL — HIGH (ref 70–99)
GLUCOSE SERPL-MCNC: 235 MG/DL — HIGH (ref 70–99)
HCT VFR BLD CALC: 30.4 % — LOW (ref 34.5–45)
HGB BLD-MCNC: 9.8 G/DL — LOW (ref 11.5–15.5)
IMM GRANULOCYTES NFR BLD AUTO: 0.3 % — SIGNIFICANT CHANGE UP (ref 0–1.5)
LYMPHOCYTES # BLD AUTO: 1.37 K/UL — SIGNIFICANT CHANGE UP (ref 1–3.3)
LYMPHOCYTES # BLD AUTO: 19.6 % — SIGNIFICANT CHANGE UP (ref 13–44)
MCHC RBC-ENTMCNC: 26.5 PG — LOW (ref 27–34)
MCHC RBC-ENTMCNC: 32.2 G/DL — SIGNIFICANT CHANGE UP (ref 32–36)
MCV RBC AUTO: 82.2 FL — SIGNIFICANT CHANGE UP (ref 80–100)
MONOCYTES # BLD AUTO: 0.35 K/UL — SIGNIFICANT CHANGE UP (ref 0–0.9)
MONOCYTES NFR BLD AUTO: 5 % — SIGNIFICANT CHANGE UP (ref 2–14)
NEUTROPHILS # BLD AUTO: 5.17 K/UL — SIGNIFICANT CHANGE UP (ref 1.8–7.4)
NEUTROPHILS NFR BLD AUTO: 73.9 % — SIGNIFICANT CHANGE UP (ref 43–77)
NRBC # BLD: 0 /100 WBCS — SIGNIFICANT CHANGE UP (ref 0–0)
PLATELET # BLD AUTO: 141 K/UL — LOW (ref 150–400)
POTASSIUM SERPL-MCNC: 4 MMOL/L — SIGNIFICANT CHANGE UP (ref 3.5–5.3)
POTASSIUM SERPL-SCNC: 4 MMOL/L — SIGNIFICANT CHANGE UP (ref 3.5–5.3)
RBC # BLD: 3.7 M/UL — LOW (ref 3.8–5.2)
RBC # FLD: 13.8 % — SIGNIFICANT CHANGE UP (ref 10.3–14.5)
SODIUM SERPL-SCNC: 139 MMOL/L — SIGNIFICANT CHANGE UP (ref 135–145)
WBC # BLD: 7 K/UL — SIGNIFICANT CHANGE UP (ref 3.8–10.5)
WBC # FLD AUTO: 7 K/UL — SIGNIFICANT CHANGE UP (ref 3.8–10.5)

## 2022-08-06 PROCEDURE — 99233 SBSQ HOSP IP/OBS HIGH 50: CPT

## 2022-08-06 RX ADMIN — Medication 400 MILLIGRAM(S): at 05:31

## 2022-08-06 RX ADMIN — INSULIN GLARGINE 8 UNIT(S): 100 INJECTION, SOLUTION SUBCUTANEOUS at 21:56

## 2022-08-06 RX ADMIN — POLYETHYLENE GLYCOL 3350 17 GRAM(S): 17 POWDER, FOR SOLUTION ORAL at 11:18

## 2022-08-06 RX ADMIN — Medication 2: at 11:17

## 2022-08-06 RX ADMIN — Medication 400 MILLIGRAM(S): at 12:17

## 2022-08-06 RX ADMIN — Medication 400 MILLIGRAM(S): at 11:17

## 2022-08-06 RX ADMIN — CEFTRIAXONE 100 MILLIGRAM(S): 500 INJECTION, POWDER, FOR SOLUTION INTRAMUSCULAR; INTRAVENOUS at 15:49

## 2022-08-06 RX ADMIN — Medication 4: at 08:13

## 2022-08-06 RX ADMIN — SENNA PLUS 2 TABLET(S): 8.6 TABLET ORAL at 21:56

## 2022-08-06 RX ADMIN — Medication 4: at 15:49

## 2022-08-06 NOTE — PATIENT PROFILE ADULT - DIABETES EDUCATION
Routing refill request to provider for review/approval because:  PHQ-9 score:    PHQ 1/15/2021   PHQ-9 Total Score 17   Q9: Thoughts of better off dead/self-harm past 2 weeks Not at all                      diabetes education

## 2022-08-06 NOTE — PATIENT PROFILE ADULT - NSPROIMPLANTSMEDDEV_GEN_A_NUR
I spoke with Umm and informed her that the letter has been updated; she has also scheduled for 12/17/20.
None

## 2022-08-06 NOTE — PATIENT PROFILE ADULT - FALL HARM RISK - HARM RISK INTERVENTIONS
Assistance with ambulation/Assistance OOB with selected safe patient handling equipment/Communicate Risk of Fall with Harm to all staff/Discuss with provider need for PT consult/Monitor gait and stability/Provide patient with walking aids - walker, cane, crutches/Reinforce activity limits and safety measures with patient and family/Tailored Fall Risk Interventions/Use of alarms - bed, chair and/or voice tab/Visual Cue: Yellow wristband and red socks/Bed in lowest position, wheels locked, appropriate side rails in place/Call bell, personal items and telephone in reach/Instruct patient to call for assistance before getting out of bed or chair/Non-slip footwear when patient is out of bed/Pride to call system/Physically safe environment - no spills, clutter or unnecessary equipment/Purposeful Proactive Rounding/Room/bathroom lighting operational, light cord in reach

## 2022-08-06 NOTE — PROGRESS NOTE ADULT - ASSESSMENT
H62 y/o female admitted with       Problem/Plan - 1:  ·  Problem: Pyelonephritis.   ·  Plan: - chart reviewed.  Pt had similar presentation at [facility] with pan sensitive E.Coli Bacteremia, she completed a course of Rocephin then Keflex (10 day treatment.)  In light of this will resume treatment with:  - Ceftriaxone 2GM daily and Gentamycin 160 mg IVPB (3mg/kg) for 1 dose given in ED  - Continuine Ceftriaxone  - will follow urine and blood cultures   - follow am BMP   - patient also has CODY present on admission due to pyelonephritis.   - tylenol for pain.    Problem/Plan - 2:  ·  Problem: Hypertension.   ·  Plan: - hold ACEI due to CODY  - resume when Cr improves   - no other BP medications for now.    Problem/Plan - 3:  ·  Problem: DM (diabetes mellitus).   ·  Plan: - hold oral medications   - Lantus 8U QHS with plan to titrate as needed to keep fingersticks < 150   - HISS.    Problem/Plan - 4:  ·  Problem: R/O Bacteremia.   ·  Plan: - following blood cultures.

## 2022-08-07 LAB
ANION GAP SERPL CALC-SCNC: 6 MMOL/L — SIGNIFICANT CHANGE UP (ref 5–17)
BUN SERPL-MCNC: 11 MG/DL — SIGNIFICANT CHANGE UP (ref 7–23)
CALCIUM SERPL-MCNC: 9 MG/DL — SIGNIFICANT CHANGE UP (ref 8.5–10.1)
CHLORIDE SERPL-SCNC: 104 MMOL/L — SIGNIFICANT CHANGE UP (ref 96–108)
CO2 SERPL-SCNC: 28 MMOL/L — SIGNIFICANT CHANGE UP (ref 22–31)
CREAT SERPL-MCNC: 0.87 MG/DL — SIGNIFICANT CHANGE UP (ref 0.5–1.3)
EGFR: 76 ML/MIN/1.73M2 — SIGNIFICANT CHANGE UP
GLUCOSE BLDC GLUCOMTR-MCNC: 159 MG/DL — HIGH (ref 70–99)
GLUCOSE BLDC GLUCOMTR-MCNC: 177 MG/DL — HIGH (ref 70–99)
GLUCOSE BLDC GLUCOMTR-MCNC: 179 MG/DL — HIGH (ref 70–99)
GLUCOSE BLDC GLUCOMTR-MCNC: 180 MG/DL — HIGH (ref 70–99)
GLUCOSE SERPL-MCNC: 151 MG/DL — HIGH (ref 70–99)
HCT VFR BLD CALC: 29.6 % — LOW (ref 34.5–45)
HGB BLD-MCNC: 9.6 G/DL — LOW (ref 11.5–15.5)
MCHC RBC-ENTMCNC: 26.8 PG — LOW (ref 27–34)
MCHC RBC-ENTMCNC: 32.4 G/DL — SIGNIFICANT CHANGE UP (ref 32–36)
MCV RBC AUTO: 82.7 FL — SIGNIFICANT CHANGE UP (ref 80–100)
NRBC # BLD: 0 /100 WBCS — SIGNIFICANT CHANGE UP (ref 0–0)
PLATELET # BLD AUTO: 158 K/UL — SIGNIFICANT CHANGE UP (ref 150–400)
POTASSIUM SERPL-MCNC: 3.9 MMOL/L — SIGNIFICANT CHANGE UP (ref 3.5–5.3)
POTASSIUM SERPL-SCNC: 3.9 MMOL/L — SIGNIFICANT CHANGE UP (ref 3.5–5.3)
RBC # BLD: 3.58 M/UL — LOW (ref 3.8–5.2)
RBC # FLD: 13.8 % — SIGNIFICANT CHANGE UP (ref 10.3–14.5)
SODIUM SERPL-SCNC: 138 MMOL/L — SIGNIFICANT CHANGE UP (ref 135–145)
WBC # BLD: 6.17 K/UL — SIGNIFICANT CHANGE UP (ref 3.8–10.5)
WBC # FLD AUTO: 6.17 K/UL — SIGNIFICANT CHANGE UP (ref 3.8–10.5)

## 2022-08-07 PROCEDURE — 99232 SBSQ HOSP IP/OBS MODERATE 35: CPT

## 2022-08-07 RX ORDER — ACETAMINOPHEN 500 MG
650 TABLET ORAL EVERY 8 HOURS
Refills: 0 | Status: DISCONTINUED | OUTPATIENT
Start: 2022-08-07 | End: 2022-08-09

## 2022-08-07 RX ADMIN — SENNA PLUS 2 TABLET(S): 8.6 TABLET ORAL at 21:57

## 2022-08-07 RX ADMIN — Medication 650 MILLIGRAM(S): at 14:16

## 2022-08-07 RX ADMIN — Medication 2: at 11:40

## 2022-08-07 RX ADMIN — Medication 650 MILLIGRAM(S): at 23:03

## 2022-08-07 RX ADMIN — INSULIN GLARGINE 8 UNIT(S): 100 INJECTION, SOLUTION SUBCUTANEOUS at 21:57

## 2022-08-07 RX ADMIN — Medication 2: at 16:02

## 2022-08-07 RX ADMIN — Medication 650 MILLIGRAM(S): at 22:03

## 2022-08-07 RX ADMIN — CEFTRIAXONE 100 MILLIGRAM(S): 500 INJECTION, POWDER, FOR SOLUTION INTRAMUSCULAR; INTRAVENOUS at 16:04

## 2022-08-07 RX ADMIN — Medication 650 MILLIGRAM(S): at 15:16

## 2022-08-07 RX ADMIN — Medication 2: at 07:57

## 2022-08-07 NOTE — PROGRESS NOTE ADULT - SUBJECTIVE AND OBJECTIVE BOX
Patient is a 62y old  Female who presents with a chief complaint of   HPI:  61 y/o female with PMHX of DM, recent hospitalization with ecoli bacteremia that presents with a 4 day history of progessively worsening fatigue, weakness, and elevated blood sugars.  She complains of anorexia as well.  She states that she tested positive for UTI at her gynecologist office and started on Macrobid which caused her to have nausea and vomit.  She states that over the last 48 hours she felt dizzy and disoriented.  She denies fevers but states that she did not know where she was.      ED course:    Patient found to be hyperglycemic, CT abdomen was positive for likely pyelonephritis  (05 Aug 2022 16:59)    SUBJECTIVE & OBJECTIVE: Pt seen and examined at bedside.   PHYSICAL EXAM:  T(C): 36.3 (22 @ 05:35), Max: 36.9 (22 @ 12:21)  HR: 77 (22 @ 05:35) (64 - 78)  BP: 122/74 (22 @ 05:35) (109/72 - 133/84)  RR: 18 (22 @ 05:35) (16 - 18)  SpO2: 99% (22 @ 05:35) (97% - 100%) Daily     Daily Weight in k.2 (06 Aug 2022 02:58)I&O's Detail    GENERAL: thin and chronically ill appearing  HEAD:  Atraumatic, Normocephalic  EYES: EOMI, PERRLA, conjunctiva and sclera clear  ENMT: Moist mucous membranes  NECK: Supple, No JVD  NERVOUS SYSTEM:  Alert & Oriented X3, Motor Strength 5/5 B/L upper and lower extremities; DTRs 2+ intact and symmetric  CHEST/LUNG: Clear to auscultation bilaterally; No rales, rhonchi, wheezing, or rubs  HEART: Regular rate and rhythm; No murmurs, rubs, or gallops  ABDOMEN: Soft, Nontender, Nondistended; Bowel sounds present  EXTREMITIES:  2+ Peripheral Pulses, No clubbing, cyanosis, or edema  LABS:                        10.0   8.78  )-----------( 165      ( 05 Aug 2022 13:30 )             30.7   Urinalysis Basic - ( 05 Aug 2022 13:30 )    Color: Yellow / Appearance: Clear / S.010 / pH: x  Gluc: x / Ketone: Negative  / Bili: Negative / Urobili: Negative mg/dL   Blood: x / Protein: 15 mg/dL / Nitrite: Positive   Leuk Esterase: Moderate / RBC: 3-5 /HPF / WBC 26-50   Sq Epi: x / Non Sq Epi: Moderate / Bacteria: Many    CAPILLARY BLOOD GLUCOSE      POCT Blood Glucose.: 238 mg/dL (05 Aug 2022 21:40)  POCT Blood Glucose.: 349 mg/dL (05 Aug 2022 17:32)    RECENT CULTURES:   RADIOLOGY & ADDITIONAL TESTS:  
Patient is a 62y old  Female who presents with a chief complaint of pyelonephritis (06 Aug 2022 06:37)    HPI:  63 y/o female with PMHX of DM, recent hospitalization with ecoli bacteremia that presents with a 4 day history of progessively worsening fatigue, weakness, and elevated blood sugars.  She complains of anorexia as well.  She states that she tested positive for UTI at her gynecologist office and started on Macrobid which caused her to have nausea and vomit.  She states that over the last 48 hours she felt dizzy and disoriented.  She denies fevers but states that she did not know where she was.      ED course:    Patient found to be hyperglycemic, CT abdomen was positive for likely pyelonephritis  (05 Aug 2022 16:59)    SUBJECTIVE & OBJECTIVE: Pt seen and examined at bedside.   PHYSICAL EXAM:  T(C): 37 (08-07-22 @ 17:17), Max: 37 (08-07-22 @ 17:17)  HR: 69 (08-07-22 @ 17:17) (66 - 73)  BP: 139/74 (08-07-22 @ 17:17) (93/59 - 139/74)  RR: 18 (08-07-22 @ 17:17) (18 - 18)  SpO2: 97% (08-07-22 @ 17:17) (97% - 98%) Daily     Daily I&O's Detail    GENERAL: NAD, well-groomed, well-developed  HEAD:  Atraumatic, Normocephalic  EYES: EOMI, PERRLA, conjunctiva and sclera clear  ENMT: Moist mucous membranes  NECK: Supple, No JVD  NERVOUS SYSTEM:  Alert & Oriented X3, Motor Strength 5/5 B/L upper and lower extremities; DTRs 2+ intact and symmetric  CHEST/LUNG: Clear to auscultation bilaterally; No rales, rhonchi, wheezing, or rubs  HEART: Regular rate and rhythm; No murmurs, rubs, or gallops  ABDOMEN: Soft, Nontender, Nondistended; Bowel sounds present  EXTREMITIES:  2+ Peripheral Pulses, No clubbing, cyanosis, or edema  LABS:                        9.6    6.17  )-----------( 158      ( 07 Aug 2022 10:45 )             29.6   CAPILLARY BLOOD GLUCOSE      POCT Blood Glucose.: 177 mg/dL (07 Aug 2022 21:10)  POCT Blood Glucose.: 159 mg/dL (07 Aug 2022 15:45)  POCT Blood Glucose.: 180 mg/dL (07 Aug 2022 11:09)  POCT Blood Glucose.: 179 mg/dL (07 Aug 2022 07:41)    RECENT CULTURES:   RADIOLOGY & ADDITIONAL TESTS:

## 2022-08-07 NOTE — PROGRESS NOTE ADULT - ASSESSMENT
63 y/o female admitted with       Problem/Plan - 1:  ·  Problem: Pyelonephritis.   ·  Plan: - chart reviewed.  Pt had similar presentation at [facility] with pan sensitive E.Coli Bacteremia, she completed a course of Rocephin then Keflex (10 day treatment.)  In light of this will resume treatment with:  - Ceftriaxone 2GM daily and Gentamycin 160 mg IVPB (3mg/kg) for 1 dose given in ED  - Continuine Ceftriaxone  - blood cultures are negative  - urine culture is growing E.coli  -follow final cultures  - plan to switch to oral and d.c home soon    Problem/Plan - 2:  ·  Problem: Hypertension.   ·  Plan: - hold ACEI due to CODY  - resume ACEI in am  - CODY is improving    Problem/Plan - 3:  ·  Problem: DM (diabetes mellitus).   ·  Plan: - hold oral medications   - Lantus 8U QHS with plan to titrate as needed to keep fingersticks < 150   - HISS.    Problem/Plan - 4:  ·  Problem: R/O Bacteremia.   ·  Plan: - rule out  - plan to switch to oral abx

## 2022-08-08 LAB
-  AMIKACIN: SIGNIFICANT CHANGE UP
-  AMOXICILLIN/CLAVULANIC ACID: SIGNIFICANT CHANGE UP
-  AMPICILLIN/SULBACTAM: SIGNIFICANT CHANGE UP
-  AMPICILLIN: SIGNIFICANT CHANGE UP
-  AZTREONAM: SIGNIFICANT CHANGE UP
-  CEFAZOLIN: SIGNIFICANT CHANGE UP
-  CEFEPIME: SIGNIFICANT CHANGE UP
-  CEFOXITIN: SIGNIFICANT CHANGE UP
-  CEFTRIAXONE: SIGNIFICANT CHANGE UP
-  CIPROFLOXACIN: SIGNIFICANT CHANGE UP
-  ERTAPENEM: SIGNIFICANT CHANGE UP
-  GENTAMICIN: SIGNIFICANT CHANGE UP
-  IMIPENEM: SIGNIFICANT CHANGE UP
-  LEVOFLOXACIN: SIGNIFICANT CHANGE UP
-  MEROPENEM: SIGNIFICANT CHANGE UP
-  NITROFURANTOIN: SIGNIFICANT CHANGE UP
-  PIPERACILLIN/TAZOBACTAM: SIGNIFICANT CHANGE UP
-  TIGECYCLINE: SIGNIFICANT CHANGE UP
-  TOBRAMYCIN: SIGNIFICANT CHANGE UP
-  TRIMETHOPRIM/SULFAMETHOXAZOLE: SIGNIFICANT CHANGE UP
ANION GAP SERPL CALC-SCNC: 6 MMOL/L — SIGNIFICANT CHANGE UP (ref 5–17)
BUN SERPL-MCNC: 10 MG/DL — SIGNIFICANT CHANGE UP (ref 7–23)
CALCIUM SERPL-MCNC: 9.4 MG/DL — SIGNIFICANT CHANGE UP (ref 8.5–10.1)
CHLORIDE SERPL-SCNC: 106 MMOL/L — SIGNIFICANT CHANGE UP (ref 96–108)
CO2 SERPL-SCNC: 29 MMOL/L — SIGNIFICANT CHANGE UP (ref 22–31)
CREAT SERPL-MCNC: 0.85 MG/DL — SIGNIFICANT CHANGE UP (ref 0.5–1.3)
CULTURE RESULTS: SIGNIFICANT CHANGE UP
EGFR: 78 ML/MIN/1.73M2 — SIGNIFICANT CHANGE UP
GLUCOSE BLDC GLUCOMTR-MCNC: 144 MG/DL — HIGH (ref 70–99)
GLUCOSE BLDC GLUCOMTR-MCNC: 161 MG/DL — HIGH (ref 70–99)
GLUCOSE BLDC GLUCOMTR-MCNC: 181 MG/DL — HIGH (ref 70–99)
GLUCOSE BLDC GLUCOMTR-MCNC: 231 MG/DL — HIGH (ref 70–99)
GLUCOSE SERPL-MCNC: 132 MG/DL — HIGH (ref 70–99)
HCT VFR BLD CALC: 30.2 % — LOW (ref 34.5–45)
HGB BLD-MCNC: 9.7 G/DL — LOW (ref 11.5–15.5)
MCHC RBC-ENTMCNC: 26.5 PG — LOW (ref 27–34)
MCHC RBC-ENTMCNC: 32.1 G/DL — SIGNIFICANT CHANGE UP (ref 32–36)
MCV RBC AUTO: 82.5 FL — SIGNIFICANT CHANGE UP (ref 80–100)
METHOD TYPE: SIGNIFICANT CHANGE UP
NRBC # BLD: 0 /100 WBCS — SIGNIFICANT CHANGE UP (ref 0–0)
ORGANISM # SPEC MICROSCOPIC CNT: SIGNIFICANT CHANGE UP
ORGANISM # SPEC MICROSCOPIC CNT: SIGNIFICANT CHANGE UP
PLATELET # BLD AUTO: 159 K/UL — SIGNIFICANT CHANGE UP (ref 150–400)
POTASSIUM SERPL-MCNC: 4 MMOL/L — SIGNIFICANT CHANGE UP (ref 3.5–5.3)
POTASSIUM SERPL-SCNC: 4 MMOL/L — SIGNIFICANT CHANGE UP (ref 3.5–5.3)
RBC # BLD: 3.66 M/UL — LOW (ref 3.8–5.2)
RBC # FLD: 14 % — SIGNIFICANT CHANGE UP (ref 10.3–14.5)
SODIUM SERPL-SCNC: 141 MMOL/L — SIGNIFICANT CHANGE UP (ref 135–145)
SPECIMEN SOURCE: SIGNIFICANT CHANGE UP
WBC # BLD: 5.92 K/UL — SIGNIFICANT CHANGE UP (ref 3.8–10.5)
WBC # FLD AUTO: 5.92 K/UL — SIGNIFICANT CHANGE UP (ref 3.8–10.5)

## 2022-08-08 PROCEDURE — 99232 SBSQ HOSP IP/OBS MODERATE 35: CPT

## 2022-08-08 RX ADMIN — Medication 650 MILLIGRAM(S): at 15:41

## 2022-08-08 RX ADMIN — Medication 4: at 11:46

## 2022-08-08 RX ADMIN — Medication 650 MILLIGRAM(S): at 16:48

## 2022-08-08 RX ADMIN — Medication 2: at 16:48

## 2022-08-08 RX ADMIN — INSULIN GLARGINE 8 UNIT(S): 100 INJECTION, SOLUTION SUBCUTANEOUS at 22:12

## 2022-08-08 RX ADMIN — CEFTRIAXONE 100 MILLIGRAM(S): 500 INJECTION, POWDER, FOR SOLUTION INTRAMUSCULAR; INTRAVENOUS at 16:48

## 2022-08-09 ENCOUNTER — TRANSCRIPTION ENCOUNTER (OUTPATIENT)
Age: 62
End: 2022-08-09

## 2022-08-09 VITALS
DIASTOLIC BLOOD PRESSURE: 64 MMHG | OXYGEN SATURATION: 98 % | SYSTOLIC BLOOD PRESSURE: 109 MMHG | RESPIRATION RATE: 18 BRPM | HEART RATE: 72 BPM | TEMPERATURE: 98 F

## 2022-08-09 LAB
GLUCOSE BLDC GLUCOMTR-MCNC: 146 MG/DL — HIGH (ref 70–99)
GLUCOSE BLDC GLUCOMTR-MCNC: 330 MG/DL — HIGH (ref 70–99)

## 2022-08-09 PROCEDURE — 99239 HOSP IP/OBS DSCHRG MGMT >30: CPT

## 2022-08-09 RX ORDER — CEFUROXIME AXETIL 250 MG
1 TABLET ORAL
Qty: 20 | Refills: 0
Start: 2022-08-09 | End: 2022-08-18

## 2022-08-09 RX ORDER — SENNA PLUS 8.6 MG/1
2 TABLET ORAL
Qty: 60 | Refills: 0
Start: 2022-08-09 | End: 2022-09-07

## 2022-08-09 RX ADMIN — Medication 8: at 11:18

## 2022-08-09 NOTE — PHYSICAL THERAPY INITIAL EVALUATION ADULT - PERTINENT HX OF CURRENT PROBLEM, REHAB EVAL
Medical records states recent hospitalization c E coli Bacteremia c progressively worsening of fatigue, elevated sugars, , UTI, dizziness and disoriented. CT abdomen 8/5: Acute Bilateral pyelonephritis and cystitis.

## 2022-08-09 NOTE — DISCHARGE NOTE NURSING/CASE MANAGEMENT/SOCIAL WORK - PATIENT PORTAL LINK FT
You can access the FollowMyHealth Patient Portal offered by [facility] by registering at the following website: [email or web address]. By joining [facility]’s FollowMyHealth portal, you will also be able to view your health information using other applications (apps) compatible with our system.

## 2022-08-09 NOTE — DISCHARGE NOTE NURSING/CASE MANAGEMENT/SOCIAL WORK - NSDCPEFALRISK_GEN_ALL_CORE
For information on Fall & Injury Prevention, visit: [email or web address] OR  [email or web address] OR  https://www.cdc.gov/steadi/patient.html

## 2022-08-09 NOTE — DISCHARGE NOTE PROVIDER - NSDCMRMEDTOKEN_GEN_ALL_CORE_FT
bisacodyl 5 mg oral delayed release tablet: 1 tab(s) orally every 12 hours, As needed, Constipation  cefuroxime 500 mg oral tablet: 1 tab(s) orally 2 times a day   famotidine 20 mg oral tablet: Per Pharmacy: 1 tab(s) orally 2 times a day  metFORMIN 1000 mg oral tablet: 1 tab(s) orally 2 times a day  ramipril 1.25 mg oral capsule: 1 cap(s) orally once a day  Rybelsus 3 mg oral tablet: 1 tab(s) orally once a day   senna leaf extract oral tablet: 2 tab(s) orally once a day (at bedtime)

## 2022-08-09 NOTE — DISCHARGE NOTE PROVIDER - NSDCCPCAREPLAN_GEN_ALL_CORE_FT
PRINCIPAL DISCHARGE DIAGNOSIS  Diagnosis: Pyelonephritis  Assessment and Plan of Treatment: This infection is due to E. Coli  - you must complete a 14 day treatement of Antibiotics  - you should take Lactobacilus while you are taking antibiotics  - follow up with your primary care doctor as an outpatient.      SECONDARY DISCHARGE DIAGNOSES  Diagnosis: DM (diabetes mellitus)  Assessment and Plan of Treatment: - resume home medications    Diagnosis: Hypertension  Assessment and Plan of Treatment: resume home medications

## 2022-08-10 LAB
CULTURE RESULTS: SIGNIFICANT CHANGE UP
CULTURE RESULTS: SIGNIFICANT CHANGE UP
SPECIMEN SOURCE: SIGNIFICANT CHANGE UP
SPECIMEN SOURCE: SIGNIFICANT CHANGE UP

## 2022-08-12 DIAGNOSIS — R63.0 ANOREXIA: ICD-10-CM

## 2022-08-12 DIAGNOSIS — I10 ESSENTIAL (PRIMARY) HYPERTENSION: ICD-10-CM

## 2022-08-12 DIAGNOSIS — B96.20 UNSPECIFIED ESCHERICHIA COLI [E. COLI] AS THE CAUSE OF DISEASES CLASSIFIED ELSEWHERE: ICD-10-CM

## 2022-08-12 DIAGNOSIS — E11.65 TYPE 2 DIABETES MELLITUS WITH HYPERGLYCEMIA: ICD-10-CM

## 2022-08-12 DIAGNOSIS — E78.5 HYPERLIPIDEMIA, UNSPECIFIED: ICD-10-CM

## 2022-08-12 DIAGNOSIS — N12 TUBULO-INTERSTITIAL NEPHRITIS, NOT SPECIFIED AS ACUTE OR CHRONIC: ICD-10-CM

## 2022-08-12 DIAGNOSIS — Z88.0 ALLERGY STATUS TO PENICILLIN: ICD-10-CM

## 2022-08-12 DIAGNOSIS — N17.9 ACUTE KIDNEY FAILURE, UNSPECIFIED: ICD-10-CM

## 2022-08-12 DIAGNOSIS — Z79.84 LONG TERM (CURRENT) USE OF ORAL HYPOGLYCEMIC DRUGS: ICD-10-CM

## 2022-09-13 LAB — GLUCOSE BLDC GLUCOMTR-MCNC: 379 MG/DL — HIGH (ref 70–99)

## 2023-05-15 NOTE — PROVIDER CONTACT NOTE (CRITICAL VALUE NOTIFICATION) - ACTION/TREATMENT ORDERED:
What Type Of Note Output Would You Prefer (Optional)?: Bullet Format Has Your Skin Lesion Been Treated?: not been treated Is This A New Presentation, Or A Follow-Up?: Skin Lesion MD notified, no new orders, pt receiving vancomycin and meropenem, continue with plan of care

## 2024-04-01 NOTE — H&P ADULT - NEUROLOGICAL
Standing Status:   Future     Standing Expiration Date:   10/1/2024    Comprehensive Metabolic Panel     Standing Status:   Future     Standing Expiration Date:   10/1/2024    Lipid Panel     Standing Status:   Future     Standing Expiration Date:   10/1/2024     Order Specific Question:   Is Patient Fasting?/# of Hours     Answer:   yes    T4, Free     Standing Status:   Future     Standing Expiration Date:   10/1/2024    TSH     Standing Status:   Future     Standing Expiration Date:   10/1/2024    Vitamin D 25 Hydroxy     Standing Status:   Future     Standing Expiration Date:   7/1/2024    Iron and TIBC     Standing Status:   Future     Standing Expiration Date:   5/1/2024     Order Specific Question:   Is Patient Fasting?     Answer:   yes     Order Specific Question:   No of Hours?     Answer:   12    Vitamin B12 & Folate     Standing Status:   Future     Standing Expiration Date:   5/1/2024     No orders of the defined types were placed in this encounter.  She needs to do MiraLAX daily  Apply some ice to her shoulder get an x-ray if not improving start physical therapy get all the above testing done    Electronically signed by Peggy Toure DO on 4/1/2024 at 5:18 PM      details… detailed exam

## 2024-04-26 NOTE — SWALLOW BEDSIDE ASSESSMENT ADULT - ASR SWALLOW LINGUAL MOBILITY
within functional limits Additional Notes: Pt will call when she is flaring so she can RTC for biopsy. Detail Level: Simple Render Risk Assessment In Note?: no

## 2024-12-16 NOTE — ED ADULT NURSE NOTE - DRUG PRE-SCREENING (DAST -1)
[facility] spoke with patient in an effort to reschedule a missed BHI appointment. Patient stated she is unable to reschedule at this this time and would like a follow up call. [facility] will follow up with the patient.     Amy Hall, [facility]  Behavioral Health Coordinator, [facility]     Statement Selected
